# Patient Record
Sex: MALE | Race: WHITE | Employment: FULL TIME | URBAN - METROPOLITAN AREA
[De-identification: names, ages, dates, MRNs, and addresses within clinical notes are randomized per-mention and may not be internally consistent; named-entity substitution may affect disease eponyms.]

---

## 2017-01-01 ENCOUNTER — OFFICE VISIT (OUTPATIENT)
Dept: INTERNAL MEDICINE CLINIC | Age: 62
End: 2017-01-01

## 2017-01-01 ENCOUNTER — APPOINTMENT (OUTPATIENT)
Dept: MRI IMAGING | Age: 62
DRG: 023 | End: 2017-01-01
Attending: SPECIALIST
Payer: COMMERCIAL

## 2017-01-01 ENCOUNTER — APPOINTMENT (OUTPATIENT)
Dept: GENERAL RADIOLOGY | Age: 62
DRG: 023 | End: 2017-01-01
Attending: INTERNAL MEDICINE
Payer: COMMERCIAL

## 2017-01-01 ENCOUNTER — APPOINTMENT (OUTPATIENT)
Dept: GENERAL RADIOLOGY | Age: 62
DRG: 023 | End: 2017-01-01
Attending: EMERGENCY MEDICINE
Payer: COMMERCIAL

## 2017-01-01 ENCOUNTER — TELEPHONE (OUTPATIENT)
Dept: INTERNAL MEDICINE CLINIC | Age: 62
End: 2017-01-01

## 2017-01-01 ENCOUNTER — ANESTHESIA (OUTPATIENT)
Dept: SURGERY | Age: 62
DRG: 023 | End: 2017-01-01
Payer: COMMERCIAL

## 2017-01-01 ENCOUNTER — HOSPITAL ENCOUNTER (INPATIENT)
Age: 62
LOS: 2 days | DRG: 023 | End: 2017-05-02
Attending: EMERGENCY MEDICINE | Admitting: HOSPITALIST
Payer: COMMERCIAL

## 2017-01-01 ENCOUNTER — ANESTHESIA EVENT (OUTPATIENT)
Dept: SURGERY | Age: 62
DRG: 023 | End: 2017-01-01
Payer: COMMERCIAL

## 2017-01-01 ENCOUNTER — APPOINTMENT (OUTPATIENT)
Dept: ULTRASOUND IMAGING | Age: 62
DRG: 023 | End: 2017-01-01
Attending: INTERNAL MEDICINE
Payer: COMMERCIAL

## 2017-01-01 ENCOUNTER — APPOINTMENT (OUTPATIENT)
Dept: CT IMAGING | Age: 62
DRG: 023 | End: 2017-01-01
Attending: EMERGENCY MEDICINE
Payer: COMMERCIAL

## 2017-01-01 ENCOUNTER — APPOINTMENT (OUTPATIENT)
Dept: CT IMAGING | Age: 62
DRG: 023 | End: 2017-01-01
Attending: SPECIALIST
Payer: COMMERCIAL

## 2017-01-01 ENCOUNTER — APPOINTMENT (OUTPATIENT)
Dept: GENERAL RADIOLOGY | Age: 62
DRG: 023 | End: 2017-01-01
Attending: ANESTHESIOLOGY
Payer: COMMERCIAL

## 2017-01-01 VITALS
WEIGHT: 185 LBS | TEMPERATURE: 98.2 F | HEART RATE: 102 BPM | HEIGHT: 72 IN | RESPIRATION RATE: 16 BRPM | OXYGEN SATURATION: 98 % | SYSTOLIC BLOOD PRESSURE: 171 MMHG | BODY MASS INDEX: 25.06 KG/M2 | DIASTOLIC BLOOD PRESSURE: 104 MMHG

## 2017-01-01 VITALS
TEMPERATURE: 99.9 F | RESPIRATION RATE: 30 BRPM | HEART RATE: 143 BPM | BODY MASS INDEX: 23.75 KG/M2 | WEIGHT: 179.23 LBS | SYSTOLIC BLOOD PRESSURE: 133 MMHG | HEIGHT: 73 IN | OXYGEN SATURATION: 80 % | DIASTOLIC BLOOD PRESSURE: 52 MMHG

## 2017-01-01 VITALS
HEIGHT: 72 IN | BODY MASS INDEX: 24.92 KG/M2 | HEART RATE: 95 BPM | SYSTOLIC BLOOD PRESSURE: 151 MMHG | WEIGHT: 184 LBS | TEMPERATURE: 98.9 F | RESPIRATION RATE: 16 BRPM | DIASTOLIC BLOOD PRESSURE: 95 MMHG | OXYGEN SATURATION: 98 %

## 2017-01-01 DIAGNOSIS — I62.9 INTRACRANIAL HEMORRHAGE (HCC): ICD-10-CM

## 2017-01-01 DIAGNOSIS — A41.9 SEPSIS, DUE TO UNSPECIFIED ORGANISM: Primary | ICD-10-CM

## 2017-01-01 DIAGNOSIS — M62.838 MUSCLE SPASMS OF NECK: Primary | ICD-10-CM

## 2017-01-01 DIAGNOSIS — M62.838 CERVICAL PARASPINAL MUSCLE SPASM: ICD-10-CM

## 2017-01-01 DIAGNOSIS — M54.2 CERVICAL SPINE PAIN: Primary | ICD-10-CM

## 2017-01-01 DIAGNOSIS — R74.8 ELEVATED LIVER ENZYMES: ICD-10-CM

## 2017-01-01 LAB
ABO + RH BLD: NORMAL
ALBUMIN SERPL BCP-MCNC: 2.8 G/DL (ref 3.5–5)
ALBUMIN/GLOB SERPL: 0.6 {RATIO} (ref 1.1–2.2)
ALP SERPL-CCNC: 267 U/L (ref 45–117)
ALT SERPL-CCNC: 259 U/L (ref 12–78)
AMORPH CRY URNS QL MICRO: ABNORMAL
AMPHET UR QL SCN: NEGATIVE
ANION GAP BLD CALC-SCNC: 10 MMOL/L (ref 5–15)
ANION GAP BLD CALC-SCNC: 11 MMOL/L (ref 5–15)
APPEARANCE UR: ABNORMAL
APTT PPP: 32.6 SEC (ref 22.1–32.5)
APTT PPP: 33.6 SEC (ref 22.1–32.5)
ARTERIAL PATENCY WRIST A: ABNORMAL
ARTERIAL PATENCY WRIST A: YES
AST SERPL W P-5'-P-CCNC: 139 U/L (ref 15–37)
ATRIAL RATE: 100 BPM
BACTERIA URNS QL MICRO: ABNORMAL /HPF
BARBITURATES UR QL SCN: NEGATIVE
BASE DEFICIT BLDA-SCNC: 0.9 MMOL/L
BASE DEFICIT BLDA-SCNC: 1.7 MMOL/L
BASE DEFICIT BLDA-SCNC: 1.8 MMOL/L
BASE DEFICIT BLDA-SCNC: 2.3 MMOL/L
BASE EXCESS BLDA CALC-SCNC: 0 MMOL/L
BASOPHILS # BLD AUTO: 0 K/UL
BASOPHILS # BLD AUTO: 0 K/UL
BASOPHILS # BLD: 0 %
BASOPHILS # BLD: 0 %
BDY SITE: ABNORMAL
BENZODIAZ UR QL: POSITIVE
BILIRUB SERPL-MCNC: 1.1 MG/DL (ref 0.2–1)
BILIRUB UR QL CFM: NEGATIVE
BLD PROD TYP BPU: NORMAL
BLD PROD TYP BPU: NORMAL
BLOOD GROUP ANTIBODIES SERPL: NORMAL
BPU ID: NORMAL
BPU ID: NORMAL
BUN SERPL-MCNC: 26 MG/DL (ref 6–20)
BUN SERPL-MCNC: 27 MG/DL (ref 6–20)
BUN/CREAT SERPL: 25 (ref 12–20)
BUN/CREAT SERPL: 27 (ref 12–20)
CALCIUM SERPL-MCNC: 8.1 MG/DL (ref 8.5–10.1)
CALCIUM SERPL-MCNC: 9.3 MG/DL (ref 8.5–10.1)
CALCULATED P AXIS, ECG09: 56 DEGREES
CALCULATED R AXIS, ECG10: 25 DEGREES
CALCULATED T AXIS, ECG11: 63 DEGREES
CANNABINOIDS UR QL SCN: NEGATIVE
CHLORIDE SERPL-SCNC: 102 MMOL/L (ref 97–108)
CHLORIDE SERPL-SCNC: 110 MMOL/L (ref 97–108)
CO2 SERPL-SCNC: 23 MMOL/L (ref 21–32)
CO2 SERPL-SCNC: 23 MMOL/L (ref 21–32)
COCAINE UR QL SCN: NEGATIVE
COLOR UR: ABNORMAL
CREAT SERPL-MCNC: 0.95 MG/DL (ref 0.7–1.3)
CREAT SERPL-MCNC: 1.07 MG/DL (ref 0.7–1.3)
DIAGNOSIS, 93000: NORMAL
DIFFERENTIAL METHOD BLD: ABNORMAL
DIFFERENTIAL METHOD BLD: ABNORMAL
DRUG SCRN COMMENT,DRGCM: ABNORMAL
EOSINOPHIL # BLD: 0 K/UL
EOSINOPHIL # BLD: 0 K/UL
EOSINOPHIL NFR BLD: 0 %
EOSINOPHIL NFR BLD: 0 %
EPAP/CPAP/PEEP, PAPEEP: 0
EPAP/CPAP/PEEP, PAPEEP: 5
EPAP/CPAP/PEEP, PAPEEP: 5
EPITH CASTS URNS QL MICRO: ABNORMAL /LPF
ERYTHROCYTE [DISTWIDTH] IN BLOOD BY AUTOMATED COUNT: 13.2 % (ref 11.5–14.5)
ERYTHROCYTE [DISTWIDTH] IN BLOOD BY AUTOMATED COUNT: 13.8 % (ref 11.5–14.5)
FIO2 ON VENT: 100 %
FIO2 ON VENT: 100 %
FIO2 ON VENT: 50 %
GAS FLOW.O2 SETTING OXYMISER: 12 L/MIN
GAS FLOW.O2 SETTING OXYMISER: 16 L/MIN
GLOBULIN SER CALC-MCNC: 4.9 G/DL (ref 2–4)
GLUCOSE BLD STRIP.AUTO-MCNC: 131 MG/DL (ref 65–100)
GLUCOSE BLD STRIP.AUTO-MCNC: 157 MG/DL (ref 65–100)
GLUCOSE SERPL-MCNC: 124 MG/DL (ref 65–100)
GLUCOSE SERPL-MCNC: 151 MG/DL (ref 65–100)
GLUCOSE UR STRIP.AUTO-MCNC: NEGATIVE MG/DL
GRAN CASTS URNS QL MICRO: ABNORMAL /LPF
HCO3 BLDA-SCNC: 20 MMOL/L (ref 22–26)
HCO3 BLDA-SCNC: 21 MMOL/L (ref 22–26)
HCO3 BLDA-SCNC: 22 MMOL/L (ref 22–26)
HCO3 BLDA-SCNC: 22 MMOL/L (ref 22–26)
HCO3 BLDA-SCNC: 23 MMOL/L (ref 22–26)
HCT VFR BLD AUTO: 31.4 % (ref 36.6–50.3)
HCT VFR BLD AUTO: 40.9 % (ref 36.6–50.3)
HGB BLD-MCNC: 10.6 G/DL (ref 12.1–17)
HGB BLD-MCNC: 14.4 G/DL (ref 12.1–17)
HGB UR QL STRIP: ABNORMAL
INR PPP: 1.2 (ref 0.9–1.1)
INR PPP: 1.4 (ref 0.9–1.1)
KETONES UR QL STRIP.AUTO: NEGATIVE MG/DL
LACTATE SERPL-SCNC: 2 MMOL/L (ref 0.4–2)
LEUKOCYTE ESTERASE UR QL STRIP.AUTO: ABNORMAL
LYMPHOCYTES # BLD AUTO: 3 %
LYMPHOCYTES # BLD AUTO: 4 %
LYMPHOCYTES # BLD: 0.9 K/UL
LYMPHOCYTES # BLD: 0.9 K/UL
MCH RBC QN AUTO: 31.1 PG (ref 26–34)
MCH RBC QN AUTO: 32.2 PG (ref 26–34)
MCHC RBC AUTO-ENTMCNC: 33.8 G/DL (ref 30–36.5)
MCHC RBC AUTO-ENTMCNC: 35.2 G/DL (ref 30–36.5)
MCV RBC AUTO: 91.5 FL (ref 80–99)
MCV RBC AUTO: 92.1 FL (ref 80–99)
METHADONE UR QL: NEGATIVE
MONOCYTES # BLD: 0.7 K/UL
MONOCYTES # BLD: 2 K/UL
MONOCYTES NFR BLD AUTO: 3 %
MONOCYTES NFR BLD AUTO: 7 %
NEUTS BAND NFR BLD MANUAL: 2 %
NEUTS BAND NFR BLD MANUAL: 8 %
NEUTS SEG # BLD: 20.5 K/UL
NEUTS SEG # BLD: 26 K/UL
NEUTS SEG NFR BLD AUTO: 82 %
NEUTS SEG NFR BLD AUTO: 91 %
NITRITE UR QL STRIP.AUTO: NEGATIVE
OPIATES UR QL: NEGATIVE
P-R INTERVAL, ECG05: 116 MS
PATH REV BLD -IMP: NORMAL
PCO2 BLDA: 25 MMHG (ref 35–45)
PCO2 BLDA: 29 MMHG (ref 35–45)
PCO2 BLDA: 31 MMHG (ref 35–45)
PCO2 BLDA: 33 MMHG (ref 35–45)
PCO2 BLDA: 43 MMHG (ref 35–45)
PCP UR QL: NEGATIVE
PH BLDA: 7.35 [PH] (ref 7.35–7.45)
PH BLDA: 7.44 [PH] (ref 7.35–7.45)
PH BLDA: 7.45 [PH] (ref 7.35–7.45)
PH BLDA: 7.5 [PH] (ref 7.35–7.45)
PH BLDA: 7.52 [PH] (ref 7.35–7.45)
PH UR STRIP: 6 [PH] (ref 5–8)
PLATELET # BLD AUTO: 215 K/UL (ref 150–400)
PLATELET # BLD AUTO: 307 K/UL (ref 150–400)
PO2 BLDA: 159 MMHG (ref 80–100)
PO2 BLDA: 178 MMHG (ref 80–100)
PO2 BLDA: 183 MMHG (ref 80–100)
PO2 BLDA: 272 MMHG (ref 80–100)
PO2 BLDA: 342 MMHG (ref 80–100)
POTASSIUM SERPL-SCNC: 3.6 MMOL/L (ref 3.5–5.1)
POTASSIUM SERPL-SCNC: 3.9 MMOL/L (ref 3.5–5.1)
PROT SERPL-MCNC: 7.7 G/DL (ref 6.4–8.2)
PROT UR STRIP-MCNC: 100 MG/DL
PROTHROMBIN TIME: 12.5 SEC (ref 9–11.1)
PROTHROMBIN TIME: 14.1 SEC (ref 9–11.1)
Q-T INTERVAL, ECG07: 320 MS
QRS DURATION, ECG06: 90 MS
QTC CALCULATION (BEZET), ECG08: 412 MS
RBC # BLD AUTO: 3.41 M/UL (ref 4.1–5.7)
RBC # BLD AUTO: 4.47 M/UL (ref 4.1–5.7)
RBC #/AREA URNS HPF: ABNORMAL /HPF (ref 0–5)
RBC MORPH BLD: ABNORMAL
RBC MORPH BLD: ABNORMAL
SAO2 % BLD: 100 % (ref 92–97)
SAO2 % BLD: 100 % (ref 92–97)
SAO2 % BLD: 99 % (ref 92–97)
SAO2% DEVICE SAO2% SENSOR NAME: ABNORMAL
SERVICE CMNT-IMP: ABNORMAL
SERVICE CMNT-IMP: ABNORMAL
SODIUM SERPL-SCNC: 136 MMOL/L (ref 136–145)
SODIUM SERPL-SCNC: 143 MMOL/L (ref 136–145)
SP GR UR REFRACTOMETRY: 1.02 (ref 1–1.03)
SPECIMEN EXP DATE BLD: NORMAL
SPECIMEN SITE: ABNORMAL
STATUS OF UNIT,%ST: NORMAL
STATUS OF UNIT,%ST: NORMAL
THERAPEUTIC RANGE,PTTT: ABNORMAL SECS (ref 58–77)
THERAPEUTIC RANGE,PTTT: ABNORMAL SECS (ref 58–77)
UA: UC IF INDICATED,UAUC: ABNORMAL
UNIT DIVISION, %UDIV: 0
UNIT DIVISION, %UDIV: 0
UROBILINOGEN UR QL STRIP.AUTO: 1 EU/DL (ref 0.2–1)
VENTILATION MODE VENT: ABNORMAL
VENTRICULAR RATE, ECG03: 100 BPM
VT SETTING VENT: 470 ML
VT SETTING VENT: 550 ML
VT SETTING VENT: 550 ML
VT SETTING VENT: 750 ML
VT SETTING VENT: 750 ML
WBC # BLD AUTO: 22.1 K/UL (ref 4.1–11.1)
WBC # BLD AUTO: 28.9 K/UL (ref 4.1–11.1)
WBC MORPH BLD: ABNORMAL
WBC URNS QL MICRO: ABNORMAL /HPF (ref 0–4)

## 2017-01-01 PROCEDURE — 76700 US EXAM ABDOM COMPLETE: CPT

## 2017-01-01 PROCEDURE — 82803 BLOOD GASES ANY COMBINATION: CPT | Performed by: EMERGENCY MEDICINE

## 2017-01-01 PROCEDURE — 31500 INSERT EMERGENCY AIRWAY: CPT

## 2017-01-01 PROCEDURE — 74011250636 HC RX REV CODE- 250/636

## 2017-01-01 PROCEDURE — 77030008768 HC TU NG VYGC -A

## 2017-01-01 PROCEDURE — 85025 COMPLETE CBC W/AUTO DIFF WBC: CPT | Performed by: EMERGENCY MEDICINE

## 2017-01-01 PROCEDURE — 36600 WITHDRAWAL OF ARTERIAL BLOOD: CPT | Performed by: EMERGENCY MEDICINE

## 2017-01-01 PROCEDURE — 74011000258 HC RX REV CODE- 258: Performed by: EMERGENCY MEDICINE

## 2017-01-01 PROCEDURE — 94002 VENT MGMT INPAT INIT DAY: CPT

## 2017-01-01 PROCEDURE — 74011250637 HC RX REV CODE- 250/637: Performed by: HOSPITALIST

## 2017-01-01 PROCEDURE — C9113 INJ PANTOPRAZOLE SODIUM, VIA: HCPCS | Performed by: INTERNAL MEDICINE

## 2017-01-01 PROCEDURE — 77030005518 HC CATH URETH FOL 2W BARD -B

## 2017-01-01 PROCEDURE — 74011000250 HC RX REV CODE- 250: Performed by: EMERGENCY MEDICINE

## 2017-01-01 PROCEDURE — 77030018836 HC SOL IRR NACL ICUM -A: Performed by: SPECIALIST

## 2017-01-01 PROCEDURE — P9045 ALBUMIN (HUMAN), 5%, 250 ML: HCPCS

## 2017-01-01 PROCEDURE — 71010 XR CHEST PORT: CPT

## 2017-01-01 PROCEDURE — C1713 ANCHOR/SCREW BN/BN,TIS/BN: HCPCS | Performed by: SPECIALIST

## 2017-01-01 PROCEDURE — 80048 BASIC METABOLIC PNL TOTAL CA: CPT | Performed by: SPECIALIST

## 2017-01-01 PROCEDURE — 77030002946 HC SUT NRLN J&J -B: Performed by: SPECIALIST

## 2017-01-01 PROCEDURE — P9059 PLASMA, FRZ BETWEEN 8-24HOUR: HCPCS | Performed by: SPECIALIST

## 2017-01-01 PROCEDURE — 36430 TRANSFUSION BLD/BLD COMPNT: CPT

## 2017-01-01 PROCEDURE — 80307 DRUG TEST PRSMV CHEM ANLYZR: CPT | Performed by: EMERGENCY MEDICINE

## 2017-01-01 PROCEDURE — 77030014007 HC SPNG HEMSTAT J&J -B: Performed by: SPECIALIST

## 2017-01-01 PROCEDURE — 77030012602 HC SPNG PTTY NEUR J&J -B: Performed by: SPECIALIST

## 2017-01-01 PROCEDURE — 77030013797 HC KT TRNSDUC PRSSR EDWD -A

## 2017-01-01 PROCEDURE — 77030003892 HC BIT DRL TWST MEDT -B: Performed by: SPECIALIST

## 2017-01-01 PROCEDURE — 77030032490 HC SLV COMPR SCD KNE COVD -B: Performed by: SPECIALIST

## 2017-01-01 PROCEDURE — 85610 PROTHROMBIN TIME: CPT | Performed by: SPECIALIST

## 2017-01-01 PROCEDURE — 00900ZZ DRAINAGE OF BRAIN, OPEN APPROACH: ICD-10-PCS | Performed by: SPECIALIST

## 2017-01-01 PROCEDURE — 96365 THER/PROPH/DIAG IV INF INIT: CPT

## 2017-01-01 PROCEDURE — 87147 CULTURE TYPE IMMUNOLOGIC: CPT | Performed by: EMERGENCY MEDICINE

## 2017-01-01 PROCEDURE — 85025 COMPLETE CBC W/AUTO DIFF WBC: CPT | Performed by: SPECIALIST

## 2017-01-01 PROCEDURE — 74011250636 HC RX REV CODE- 250/636: Performed by: ANESTHESIOLOGY

## 2017-01-01 PROCEDURE — 70553 MRI BRAIN STEM W/O & W/DYE: CPT

## 2017-01-01 PROCEDURE — 36415 COLL VENOUS BLD VENIPUNCTURE: CPT | Performed by: EMERGENCY MEDICINE

## 2017-01-01 PROCEDURE — 74011250636 HC RX REV CODE- 250/636: Performed by: SPECIALIST

## 2017-01-01 PROCEDURE — 94003 VENT MGMT INPAT SUBQ DAY: CPT

## 2017-01-01 PROCEDURE — 74011250636 HC RX REV CODE- 250/636: Performed by: EMERGENCY MEDICINE

## 2017-01-01 PROCEDURE — 99285 EMERGENCY DEPT VISIT HI MDM: CPT

## 2017-01-01 PROCEDURE — 86900 BLOOD TYPING SEROLOGIC ABO: CPT | Performed by: SPECIALIST

## 2017-01-01 PROCEDURE — 77010033678 HC OXYGEN DAILY

## 2017-01-01 PROCEDURE — 77030004472 HC BUR TAPR MEDT -B: Performed by: SPECIALIST

## 2017-01-01 PROCEDURE — C1751 CATH, INF, PER/CENT/MIDLINE: HCPCS

## 2017-01-01 PROCEDURE — 96375 TX/PRO/DX INJ NEW DRUG ADDON: CPT

## 2017-01-01 PROCEDURE — 5A1945Z RESPIRATORY VENTILATION, 24-96 CONSECUTIVE HOURS: ICD-10-PCS | Performed by: EMERGENCY MEDICINE

## 2017-01-01 PROCEDURE — 03HB33Z INSERTION OF INFUSION DEVICE INTO RIGHT RADIAL ARTERY, PERCUTANEOUS APPROACH: ICD-10-PCS | Performed by: ANESTHESIOLOGY

## 2017-01-01 PROCEDURE — A6209 FOAM DRSG <=16 SQ IN W/O BDR: HCPCS

## 2017-01-01 PROCEDURE — 77030004416 HC BUR PERF J&J -C: Performed by: SPECIALIST

## 2017-01-01 PROCEDURE — 88304 TISSUE EXAM BY PATHOLOGIST: CPT | Performed by: SPECIALIST

## 2017-01-01 PROCEDURE — 74011000250 HC RX REV CODE- 250: Performed by: SPECIALIST

## 2017-01-01 PROCEDURE — 70450 CT HEAD/BRAIN W/O DYE: CPT

## 2017-01-01 PROCEDURE — 74011000258 HC RX REV CODE- 258: Performed by: SPECIALIST

## 2017-01-01 PROCEDURE — 36415 COLL VENOUS BLD VENIPUNCTURE: CPT | Performed by: SPECIALIST

## 2017-01-01 PROCEDURE — 77030014355 HC CVR BUR H TI BIOM -C: Performed by: SPECIALIST

## 2017-01-01 PROCEDURE — 77030008683 HC TU ET CUF COVD -A

## 2017-01-01 PROCEDURE — 77030003029 HC SUT VCRL J&J -B: Performed by: SPECIALIST

## 2017-01-01 PROCEDURE — 80053 COMPREHEN METABOLIC PANEL: CPT | Performed by: EMERGENCY MEDICINE

## 2017-01-01 PROCEDURE — 74011250637 HC RX REV CODE- 250/637: Performed by: NEUROLOGICAL SURGERY

## 2017-01-01 PROCEDURE — 82803 BLOOD GASES ANY COMBINATION: CPT | Performed by: INTERNAL MEDICINE

## 2017-01-01 PROCEDURE — 87040 BLOOD CULTURE FOR BACTERIA: CPT | Performed by: EMERGENCY MEDICINE

## 2017-01-01 PROCEDURE — 77030005537 HC CATH URETH BARD -A: Performed by: SPECIALIST

## 2017-01-01 PROCEDURE — 87086 URINE CULTURE/COLONY COUNT: CPT | Performed by: EMERGENCY MEDICINE

## 2017-01-01 PROCEDURE — 74011250636 HC RX REV CODE- 250/636: Performed by: NEUROLOGICAL SURGERY

## 2017-01-01 PROCEDURE — 82962 GLUCOSE BLOOD TEST: CPT

## 2017-01-01 PROCEDURE — 77030030722 HC PIN SKULL MAYFLD INLC -B: Performed by: SPECIALIST

## 2017-01-01 PROCEDURE — 74011000250 HC RX REV CODE- 250

## 2017-01-01 PROCEDURE — 65610000006 HC RM INTENSIVE CARE

## 2017-01-01 PROCEDURE — 74011250636 HC RX REV CODE- 250/636: Performed by: HOSPITALIST

## 2017-01-01 PROCEDURE — 75810000137 HC PLCMT CENT VENOUS CATH

## 2017-01-01 PROCEDURE — 77030014647 HC SEAL FBRN TISSL BAXT -D: Performed by: SPECIALIST

## 2017-01-01 PROCEDURE — 94762 N-INVAS EAR/PLS OXIMTRY CONT: CPT

## 2017-01-01 PROCEDURE — 0BH17EZ INSERTION OF ENDOTRACHEAL AIRWAY INTO TRACHEA, VIA NATURAL OR ARTIFICIAL OPENING: ICD-10-PCS | Performed by: EMERGENCY MEDICINE

## 2017-01-01 PROCEDURE — 85730 THROMBOPLASTIN TIME PARTIAL: CPT | Performed by: SPECIALIST

## 2017-01-01 PROCEDURE — 81001 URINALYSIS AUTO W/SCOPE: CPT | Performed by: EMERGENCY MEDICINE

## 2017-01-01 PROCEDURE — 77030002996 HC SUT SLK J&J -A: Performed by: SPECIALIST

## 2017-01-01 PROCEDURE — 77030018390 HC SPNG HEMSTAT2 J&J -B: Performed by: SPECIALIST

## 2017-01-01 PROCEDURE — 96367 TX/PROPH/DG ADDL SEQ IV INF: CPT

## 2017-01-01 PROCEDURE — 74011000258 HC RX REV CODE- 258: Performed by: HOSPITALIST

## 2017-01-01 PROCEDURE — 74000 XR ABD PORT  1 V: CPT

## 2017-01-01 PROCEDURE — 77030009081 HC CLP NEUR GUN SET MEDT -B: Performed by: SPECIALIST

## 2017-01-01 PROCEDURE — 76060000037 HC ANESTHESIA 3 TO 3.5 HR: Performed by: SPECIALIST

## 2017-01-01 PROCEDURE — 83605 ASSAY OF LACTIC ACID: CPT | Performed by: EMERGENCY MEDICINE

## 2017-01-01 PROCEDURE — 74011250637 HC RX REV CODE- 250/637: Performed by: EMERGENCY MEDICINE

## 2017-01-01 PROCEDURE — 02HV33Z INSERTION OF INFUSION DEVICE INTO SUPERIOR VENA CAVA, PERCUTANEOUS APPROACH: ICD-10-PCS | Performed by: ANESTHESIOLOGY

## 2017-01-01 PROCEDURE — 74011250636 HC RX REV CODE- 250/636: Performed by: INTERNAL MEDICINE

## 2017-01-01 PROCEDURE — 77030018673: Performed by: SPECIALIST

## 2017-01-01 PROCEDURE — 77030013797 HC KT TRNSDUC PRSSR EDWD -A: Performed by: ANESTHESIOLOGY

## 2017-01-01 PROCEDURE — 87077 CULTURE AEROBIC IDENTIFY: CPT | Performed by: EMERGENCY MEDICINE

## 2017-01-01 PROCEDURE — 74011000258 HC RX REV CODE- 258: Performed by: NEUROLOGICAL SURGERY

## 2017-01-01 PROCEDURE — 93005 ELECTROCARDIOGRAM TRACING: CPT

## 2017-01-01 PROCEDURE — 77030010940 HC CLP SCALP RELD MEDT -B: Performed by: SPECIALIST

## 2017-01-01 PROCEDURE — 85730 THROMBOPLASTIN TIME PARTIAL: CPT | Performed by: EMERGENCY MEDICINE

## 2017-01-01 PROCEDURE — A9576 INJ PROHANCE MULTIPACK: HCPCS | Performed by: EMERGENCY MEDICINE

## 2017-01-01 PROCEDURE — 74011000272 HC RX REV CODE- 272: Performed by: SPECIALIST

## 2017-01-01 PROCEDURE — 87186 SC STD MICRODIL/AGAR DIL: CPT | Performed by: EMERGENCY MEDICINE

## 2017-01-01 PROCEDURE — 74011000250 HC RX REV CODE- 250: Performed by: INTERNAL MEDICINE

## 2017-01-01 PROCEDURE — 85610 PROTHROMBIN TIME: CPT | Performed by: EMERGENCY MEDICINE

## 2017-01-01 PROCEDURE — 77030029131 HC ADMN ST IV BLD N DEHP ICUM -B

## 2017-01-01 PROCEDURE — 76010000173 HC OR TIME 3 TO 3.5 HR INTENSV-TIER 1: Performed by: SPECIALIST

## 2017-01-01 PROCEDURE — 77030004391 HC BUR FLUT MEDT -C: Performed by: SPECIALIST

## 2017-01-01 PROCEDURE — 74011250637 HC RX REV CODE- 250/637: Performed by: SPECIALIST

## 2017-01-01 DEVICE — SCREW BONE L4MM DIA1.5MM CRANIOMAXILLOFACIAL GLD TI ST: Type: IMPLANTABLE DEVICE | Site: SKULL | Status: FUNCTIONAL

## 2017-01-01 DEVICE — COVER BUR H L DIA18.5MM THK0.5MM 5 H NEURO TI W/O TAB: Type: IMPLANTABLE DEVICE | Site: SKULL | Status: FUNCTIONAL

## 2017-01-01 RX ORDER — SODIUM CHLORIDE, SODIUM LACTATE, POTASSIUM CHLORIDE, CALCIUM CHLORIDE 600; 310; 30; 20 MG/100ML; MG/100ML; MG/100ML; MG/100ML
25 INJECTION, SOLUTION INTRAVENOUS CONTINUOUS
Status: DISCONTINUED | OUTPATIENT
Start: 2017-01-01 | End: 2017-01-01

## 2017-01-01 RX ORDER — ALBUMIN HUMAN 50 G/1000ML
SOLUTION INTRAVENOUS AS NEEDED
Status: DISCONTINUED | OUTPATIENT
Start: 2017-01-01 | End: 2017-01-01 | Stop reason: HOSPADM

## 2017-01-01 RX ORDER — SODIUM CHLORIDE 0.9 % (FLUSH) 0.9 %
5-10 SYRINGE (ML) INJECTION AS NEEDED
Status: DISCONTINUED | OUTPATIENT
Start: 2017-01-01 | End: 2017-01-01 | Stop reason: HOSPADM

## 2017-01-01 RX ORDER — DEXAMETHASONE SODIUM PHOSPHATE 4 MG/ML
INJECTION, SOLUTION INTRA-ARTICULAR; INTRALESIONAL; INTRAMUSCULAR; INTRAVENOUS; SOFT TISSUE AS NEEDED
Status: DISCONTINUED | OUTPATIENT
Start: 2017-01-01 | End: 2017-01-01 | Stop reason: HOSPADM

## 2017-01-01 RX ORDER — LEVOFLOXACIN 5 MG/ML
750 INJECTION, SOLUTION INTRAVENOUS EVERY 24 HOURS
Status: DISCONTINUED | OUTPATIENT
Start: 2017-01-01 | End: 2017-01-01 | Stop reason: HOSPADM

## 2017-01-01 RX ORDER — ROCURONIUM BROMIDE 10 MG/ML
INJECTION, SOLUTION INTRAVENOUS
Status: COMPLETED | OUTPATIENT
Start: 2017-01-01 | End: 2017-01-01

## 2017-01-01 RX ORDER — SODIUM CHLORIDE 0.9 % (FLUSH) 0.9 %
5-10 SYRINGE (ML) INJECTION EVERY 8 HOURS
Status: DISCONTINUED | OUTPATIENT
Start: 2017-01-01 | End: 2017-01-01 | Stop reason: HOSPADM

## 2017-01-01 RX ORDER — ONDANSETRON 2 MG/ML
4 INJECTION INTRAMUSCULAR; INTRAVENOUS
Status: COMPLETED | OUTPATIENT
Start: 2017-01-01 | End: 2017-01-01

## 2017-01-01 RX ORDER — LORAZEPAM 2 MG/ML
2 INJECTION INTRAMUSCULAR
Status: DISCONTINUED | OUTPATIENT
Start: 2017-01-01 | End: 2017-01-01 | Stop reason: HOSPADM

## 2017-01-01 RX ORDER — MUPIROCIN 20 MG/G
OINTMENT TOPICAL 2 TIMES DAILY
Status: DISCONTINUED | OUTPATIENT
Start: 2017-01-01 | End: 2017-01-01 | Stop reason: HOSPADM

## 2017-01-01 RX ORDER — SODIUM CHLORIDE 9 MG/ML
25 INJECTION, SOLUTION INTRAVENOUS CONTINUOUS
Status: DISCONTINUED | OUTPATIENT
Start: 2017-01-01 | End: 2017-01-01 | Stop reason: HOSPADM

## 2017-01-01 RX ORDER — ACETAMINOPHEN 650 MG/1
650 SUPPOSITORY RECTAL
Status: DISCONTINUED | OUTPATIENT
Start: 2017-01-01 | End: 2017-01-01 | Stop reason: HOSPADM

## 2017-01-01 RX ORDER — SODIUM CHLORIDE 9 MG/ML
INJECTION, SOLUTION INTRAVENOUS
Status: DISCONTINUED | OUTPATIENT
Start: 2017-01-01 | End: 2017-01-01 | Stop reason: HOSPADM

## 2017-01-01 RX ORDER — SODIUM CHLORIDE, SODIUM LACTATE, POTASSIUM CHLORIDE, CALCIUM CHLORIDE 600; 310; 30; 20 MG/100ML; MG/100ML; MG/100ML; MG/100ML
25 INJECTION, SOLUTION INTRAVENOUS CONTINUOUS
Status: DISPENSED | OUTPATIENT
Start: 2017-01-01 | End: 2017-01-01

## 2017-01-01 RX ORDER — LIDOCAINE HYDROCHLORIDE 10 MG/ML
0.1 INJECTION, SOLUTION EPIDURAL; INFILTRATION; INTRACAUDAL; PERINEURAL AS NEEDED
Status: DISCONTINUED | OUTPATIENT
Start: 2017-01-01 | End: 2017-01-01 | Stop reason: HOSPADM

## 2017-01-01 RX ORDER — MORPHINE SULFATE 4 MG/ML
2-8 INJECTION, SOLUTION INTRAMUSCULAR; INTRAVENOUS AS NEEDED
Status: DISCONTINUED | OUTPATIENT
Start: 2017-01-01 | End: 2017-01-01 | Stop reason: HOSPADM

## 2017-01-01 RX ORDER — ACETAMINOPHEN 325 MG/1
650 TABLET ORAL ONCE
Status: DISCONTINUED | OUTPATIENT
Start: 2017-01-01 | End: 2017-01-01

## 2017-01-01 RX ORDER — ACETAMINOPHEN 325 MG/1
650 TABLET ORAL
Status: DISCONTINUED | OUTPATIENT
Start: 2017-01-01 | End: 2017-01-01 | Stop reason: HOSPADM

## 2017-01-01 RX ORDER — CEFAZOLIN SODIUM IN 0.9 % NACL 2 G/100 ML
PLASTIC BAG, INJECTION (ML) INTRAVENOUS AS NEEDED
Status: DISCONTINUED | OUTPATIENT
Start: 2017-01-01 | End: 2017-01-01 | Stop reason: HOSPADM

## 2017-01-01 RX ORDER — NICARDIPINE HYDROCHLORIDE 0.2 MG/ML
5-15 INJECTION INTRAVENOUS
Status: DISCONTINUED | OUTPATIENT
Start: 2017-01-01 | End: 2017-01-01

## 2017-01-01 RX ORDER — PREDNISONE 20 MG/1
TABLET ORAL
Qty: 18 TAB | Refills: 0 | Status: SHIPPED | OUTPATIENT
Start: 2017-01-01 | End: 2017-01-01 | Stop reason: SDUPTHER

## 2017-01-01 RX ORDER — ONDANSETRON 2 MG/ML
INJECTION INTRAMUSCULAR; INTRAVENOUS AS NEEDED
Status: DISCONTINUED | OUTPATIENT
Start: 2017-01-01 | End: 2017-01-01 | Stop reason: HOSPADM

## 2017-01-01 RX ORDER — DIAZEPAM 2 MG/1
2-4 TABLET ORAL
Qty: 20 TAB | Refills: 0 | Status: SHIPPED | OUTPATIENT
Start: 2017-01-01

## 2017-01-01 RX ORDER — VANCOMYCIN/0.9 % SOD CHLORIDE 1.5G/250ML
1500 PLASTIC BAG, INJECTION (ML) INTRAVENOUS EVERY 12 HOURS
Status: DISCONTINUED | OUTPATIENT
Start: 2017-01-01 | End: 2017-01-01 | Stop reason: HOSPADM

## 2017-01-01 RX ORDER — HYDROMORPHONE HYDROCHLORIDE 1 MG/ML
0.2 INJECTION, SOLUTION INTRAMUSCULAR; INTRAVENOUS; SUBCUTANEOUS
Status: DISCONTINUED | OUTPATIENT
Start: 2017-01-01 | End: 2017-01-01 | Stop reason: HOSPADM

## 2017-01-01 RX ORDER — SODIUM CHLORIDE 0.9 % (FLUSH) 0.9 %
10 SYRINGE (ML) INJECTION
Status: COMPLETED | OUTPATIENT
Start: 2017-01-01 | End: 2017-01-01

## 2017-01-01 RX ORDER — DIAZEPAM 5 MG/1
5 TABLET ORAL
Qty: 20 TAB | Refills: 0 | Status: SHIPPED | OUTPATIENT
Start: 2017-01-01

## 2017-01-01 RX ORDER — PROPOFOL 10 MG/ML
5-50 VIAL (ML) INTRAVENOUS
Status: DISCONTINUED | OUTPATIENT
Start: 2017-01-01 | End: 2017-01-01 | Stop reason: HOSPADM

## 2017-01-01 RX ORDER — LIDOCAINE HYDROCHLORIDE AND EPINEPHRINE 10; 10 MG/ML; UG/ML
INJECTION, SOLUTION INFILTRATION; PERINEURAL AS NEEDED
Status: DISCONTINUED | OUTPATIENT
Start: 2017-01-01 | End: 2017-01-01 | Stop reason: HOSPADM

## 2017-01-01 RX ORDER — MORPHINE SULFATE 2 MG/ML
2 INJECTION, SOLUTION INTRAMUSCULAR; INTRAVENOUS
Status: DISCONTINUED | OUTPATIENT
Start: 2017-01-01 | End: 2017-01-01 | Stop reason: HOSPADM

## 2017-01-01 RX ORDER — FENTANYL CITRATE 50 UG/ML
25 INJECTION, SOLUTION INTRAMUSCULAR; INTRAVENOUS
Status: DISCONTINUED | OUTPATIENT
Start: 2017-01-01 | End: 2017-01-01 | Stop reason: HOSPADM

## 2017-01-01 RX ORDER — MANNITOL 20 G/100ML
50 INJECTION, SOLUTION INTRAVENOUS
Status: COMPLETED | OUTPATIENT
Start: 2017-01-01 | End: 2017-01-01

## 2017-01-01 RX ORDER — FENTANYL CITRATE 50 UG/ML
INJECTION, SOLUTION INTRAMUSCULAR; INTRAVENOUS AS NEEDED
Status: DISCONTINUED | OUTPATIENT
Start: 2017-01-01 | End: 2017-01-01 | Stop reason: HOSPADM

## 2017-01-01 RX ORDER — ONDANSETRON 2 MG/ML
4 INJECTION INTRAMUSCULAR; INTRAVENOUS
Status: DISCONTINUED | OUTPATIENT
Start: 2017-01-01 | End: 2017-01-01 | Stop reason: HOSPADM

## 2017-01-01 RX ORDER — PROPOFOL 10 MG/ML
5 VIAL (ML) INTRAVENOUS
Status: DISCONTINUED | OUTPATIENT
Start: 2017-01-01 | End: 2017-01-01

## 2017-01-01 RX ORDER — LORAZEPAM 2 MG/ML
INJECTION INTRAMUSCULAR
Status: COMPLETED
Start: 2017-01-01 | End: 2017-01-01

## 2017-01-01 RX ORDER — ONDANSETRON 2 MG/ML
INJECTION INTRAMUSCULAR; INTRAVENOUS
Status: COMPLETED
Start: 2017-01-01 | End: 2017-01-01

## 2017-01-01 RX ORDER — PROPOFOL 10 MG/ML
5-50 VIAL (ML) INTRAVENOUS
Status: DISCONTINUED | OUTPATIENT
Start: 2017-01-01 | End: 2017-01-01

## 2017-01-01 RX ORDER — ROCURONIUM BROMIDE 10 MG/ML
INJECTION, SOLUTION INTRAVENOUS AS NEEDED
Status: DISCONTINUED | OUTPATIENT
Start: 2017-01-01 | End: 2017-01-01 | Stop reason: HOSPADM

## 2017-01-01 RX ORDER — DIPHENHYDRAMINE HYDROCHLORIDE 50 MG/ML
12.5 INJECTION, SOLUTION INTRAMUSCULAR; INTRAVENOUS AS NEEDED
Status: ACTIVE | OUTPATIENT
Start: 2017-01-01 | End: 2017-01-01

## 2017-01-01 RX ORDER — PHENYLEPHRINE HCL IN 0.9% NACL 0.4MG/10ML
SYRINGE (ML) INTRAVENOUS AS NEEDED
Status: DISCONTINUED | OUTPATIENT
Start: 2017-01-01 | End: 2017-01-01 | Stop reason: HOSPADM

## 2017-01-01 RX ORDER — SODIUM CHLORIDE AND POTASSIUM CHLORIDE .9; .15 G/100ML; G/100ML
SOLUTION INTRAVENOUS CONTINUOUS
Status: DISCONTINUED | OUTPATIENT
Start: 2017-01-01 | End: 2017-01-01 | Stop reason: HOSPADM

## 2017-01-01 RX ORDER — CHLORHEXIDINE GLUCONATE 1.2 MG/ML
15 RINSE ORAL 2 TIMES DAILY
Status: DISCONTINUED | OUTPATIENT
Start: 2017-01-01 | End: 2017-01-01 | Stop reason: HOSPADM

## 2017-01-01 RX ORDER — SODIUM CHLORIDE 9 MG/ML
250 INJECTION, SOLUTION INTRAVENOUS AS NEEDED
Status: DISCONTINUED | OUTPATIENT
Start: 2017-01-01 | End: 2017-01-01 | Stop reason: HOSPADM

## 2017-01-01 RX ORDER — ETOMIDATE 2 MG/ML
INJECTION INTRAVENOUS
Status: COMPLETED | OUTPATIENT
Start: 2017-01-01 | End: 2017-01-01

## 2017-01-01 RX ORDER — PREDNISONE 20 MG/1
TABLET ORAL
Qty: 18 TAB | Refills: 0 | Status: SHIPPED | OUTPATIENT
Start: 2017-01-01

## 2017-01-01 RX ORDER — VANCOMYCIN 2 GRAM/500 ML IN 0.9 % SODIUM CHLORIDE INTRAVENOUS
2000 ONCE
Status: COMPLETED | OUTPATIENT
Start: 2017-01-01 | End: 2017-01-01

## 2017-01-01 RX ADMIN — SODIUM CHLORIDE 25 ML/HR: 900 INJECTION, SOLUTION INTRAVENOUS at 20:15

## 2017-01-01 RX ADMIN — Medication 10 ML: at 05:23

## 2017-01-01 RX ADMIN — Medication 10 ML: at 14:16

## 2017-01-01 RX ADMIN — CEFEPIME HYDROCHLORIDE 2 G: 2 INJECTION, POWDER, FOR SOLUTION INTRAVENOUS at 00:10

## 2017-01-01 RX ADMIN — LEVETIRACETAM 500 MG: 100 INJECTION, SOLUTION, CONCENTRATE INTRAVENOUS at 21:00

## 2017-01-01 RX ADMIN — PROPOFOL 50 MCG/KG/MIN: 10 INJECTION, EMULSION INTRAVENOUS at 23:19

## 2017-01-01 RX ADMIN — LORAZEPAM 2 MG: 2 INJECTION INTRAMUSCULAR; INTRAVENOUS at 22:26

## 2017-01-01 RX ADMIN — PHENYLEPHRINE HYDROCHLORIDE 120 MCG/MIN: 10 INJECTION INTRAVENOUS at 20:18

## 2017-01-01 RX ADMIN — Medication 60 MCG: at 18:19

## 2017-01-01 RX ADMIN — ONDANSETRON 4 MG: 2 INJECTION INTRAMUSCULAR; INTRAVENOUS at 14:15

## 2017-01-01 RX ADMIN — MORPHINE SULFATE 8 MG: 4 INJECTION, SOLUTION INTRAMUSCULAR; INTRAVENOUS at 00:41

## 2017-01-01 RX ADMIN — CHLORHEXIDINE GLUCONATE 15 ML: 1.2 RINSE ORAL at 08:29

## 2017-01-01 RX ADMIN — ACETAMINOPHEN 650 MG: 650 SUPPOSITORY RECTAL at 00:03

## 2017-01-01 RX ADMIN — Medication 10 ML: at 22:27

## 2017-01-01 RX ADMIN — ROCURONIUM BROMIDE 100 MG: 10 INJECTION INTRAVENOUS at 14:48

## 2017-01-01 RX ADMIN — VANCOMYCIN HYDROCHLORIDE 2000 MG: 10 INJECTION, POWDER, LYOPHILIZED, FOR SOLUTION INTRAVENOUS at 11:08

## 2017-01-01 RX ADMIN — SODIUM CHLORIDE AND POTASSIUM CHLORIDE: 9; 1.49 INJECTION, SOLUTION INTRAVENOUS at 23:23

## 2017-01-01 RX ADMIN — PROPOFOL 8 MCG/KG/MIN: 10 INJECTION, EMULSION INTRAVENOUS at 15:20

## 2017-01-01 RX ADMIN — MORPHINE SULFATE 4 MG: 4 INJECTION, SOLUTION INTRAMUSCULAR; INTRAVENOUS at 21:07

## 2017-01-01 RX ADMIN — MORPHINE SULFATE 4 MG: 4 INJECTION, SOLUTION INTRAMUSCULAR; INTRAVENOUS at 21:53

## 2017-01-01 RX ADMIN — CHLORHEXIDINE GLUCONATE 15 ML: 1.2 RINSE ORAL at 22:24

## 2017-01-01 RX ADMIN — SODIUM CHLORIDE 500 ML: 900 INJECTION, SOLUTION INTRAVENOUS at 13:53

## 2017-01-01 RX ADMIN — ONDANSETRON HYDROCHLORIDE 4 MG: 2 INJECTION, SOLUTION INTRAMUSCULAR; INTRAVENOUS at 14:15

## 2017-01-01 RX ADMIN — MUPIROCIN: 20 OINTMENT TOPICAL at 22:26

## 2017-01-01 RX ADMIN — HYDROMORPHONE HYDROCHLORIDE 0.2 MG: 1 INJECTION, SOLUTION INTRAMUSCULAR; INTRAVENOUS; SUBCUTANEOUS at 23:38

## 2017-01-01 RX ADMIN — LEVOFLOXACIN 750 MG: 5 INJECTION, SOLUTION INTRAVENOUS at 08:44

## 2017-01-01 RX ADMIN — LEVETIRACETAM 500 MG: 100 INJECTION, SOLUTION, CONCENTRATE INTRAVENOUS at 22:49

## 2017-01-01 RX ADMIN — ROCURONIUM BROMIDE 50 MG: 10 INJECTION, SOLUTION INTRAVENOUS at 17:04

## 2017-01-01 RX ADMIN — MORPHINE SULFATE 2 MG: 4 INJECTION, SOLUTION INTRAMUSCULAR; INTRAVENOUS at 20:24

## 2017-01-01 RX ADMIN — ONDANSETRON 4 MG: 2 INJECTION INTRAMUSCULAR; INTRAVENOUS at 19:18

## 2017-01-01 RX ADMIN — Medication 2 MG: at 18:38

## 2017-01-01 RX ADMIN — FENTANYL CITRATE 100 MCG: 50 INJECTION, SOLUTION INTRAMUSCULAR; INTRAVENOUS at 16:58

## 2017-01-01 RX ADMIN — LEVETIRACETAM 500 MG: 100 INJECTION, SOLUTION, CONCENTRATE INTRAVENOUS at 09:07

## 2017-01-01 RX ADMIN — CHLORHEXIDINE GLUCONATE 15 ML: 1.2 RINSE ORAL at 18:54

## 2017-01-01 RX ADMIN — MUPIROCIN: 20 OINTMENT TOPICAL at 09:13

## 2017-01-01 RX ADMIN — PROPOFOL 5 MCG/KG/MIN: 10 INJECTION, EMULSION INTRAVENOUS at 15:02

## 2017-01-01 RX ADMIN — MORPHINE SULFATE 8 MG: 4 INJECTION, SOLUTION INTRAMUSCULAR; INTRAVENOUS at 00:09

## 2017-01-01 RX ADMIN — GADOTERIDOL 16 ML: 279.3 INJECTION, SOLUTION INTRAVENOUS at 10:22

## 2017-01-01 RX ADMIN — SODIUM CHLORIDE: 9 INJECTION, SOLUTION INTRAVENOUS at 16:17

## 2017-01-01 RX ADMIN — MANNITOL 50 G: 20 INJECTION, SOLUTION INTRAVENOUS at 16:11

## 2017-01-01 RX ADMIN — ETOMIDATE 20 MG: 2 INJECTION, SOLUTION INTRAVENOUS at 14:47

## 2017-01-01 RX ADMIN — LEVETIRACETAM 500 MG: 100 INJECTION, SOLUTION, CONCENTRATE INTRAVENOUS at 23:14

## 2017-01-01 RX ADMIN — MORPHINE SULFATE 8 MG: 4 INJECTION, SOLUTION INTRAMUSCULAR; INTRAVENOUS at 23:19

## 2017-01-01 RX ADMIN — Medication 2 G: at 17:19

## 2017-01-01 RX ADMIN — ROCURONIUM BROMIDE 50 MG: 10 INJECTION INTRAVENOUS at 14:50

## 2017-01-01 RX ADMIN — CEFEPIME HYDROCHLORIDE 2 G: 2 INJECTION, POWDER, FOR SOLUTION INTRAVENOUS at 08:17

## 2017-01-01 RX ADMIN — MORPHINE SULFATE 4 MG: 4 INJECTION, SOLUTION INTRAMUSCULAR; INTRAVENOUS at 22:39

## 2017-01-01 RX ADMIN — SODIUM CHLORIDE 40 MG: 9 INJECTION INTRAMUSCULAR; INTRAVENOUS; SUBCUTANEOUS at 11:59

## 2017-01-01 RX ADMIN — ALBUMIN HUMAN 250 ML: 50 SOLUTION INTRAVENOUS at 18:35

## 2017-01-01 RX ADMIN — CEFEPIME HYDROCHLORIDE 2 G: 2 INJECTION, POWDER, FOR SOLUTION INTRAVENOUS at 16:30

## 2017-01-01 RX ADMIN — MORPHINE SULFATE 8 MG: 4 INJECTION, SOLUTION INTRAMUSCULAR; INTRAVENOUS at 01:13

## 2017-01-01 RX ADMIN — SODIUM CHLORIDE AND POTASSIUM CHLORIDE: 9; 1.49 INJECTION, SOLUTION INTRAVENOUS at 14:41

## 2017-01-01 RX ADMIN — Medication 10 ML: at 10:34

## 2017-01-01 RX ADMIN — ACETAMINOPHEN 975 MG: 650 SUPPOSITORY RECTAL at 14:37

## 2017-01-01 RX ADMIN — DEXAMETHASONE SODIUM PHOSPHATE 4 MG: 4 INJECTION, SOLUTION INTRA-ARTICULAR; INTRALESIONAL; INTRAMUSCULAR; INTRAVENOUS; SOFT TISSUE at 19:18

## 2017-01-01 RX ADMIN — MUPIROCIN: 20 OINTMENT TOPICAL at 18:54

## 2017-04-20 NOTE — TELEPHONE ENCOUNTER
Pt is requesting a Rx for muscle spasms. Please contact pt at [phone].        Message received & copied from [facility]

## 2017-04-21 NOTE — PROGRESS NOTES
Reviewed record in preparation for visit and have obtained necessary documentation. Identified pt with two pt identifiers(name and ). No chief complaint on file. There are no preventive care reminders to display for this patient. Mr. Christine Rodriguez has a reminder for a \"due or due soon\" health maintenance. I have asked that he discuss health maintenance topic(s) due with His  primary care provider. Coordination of Care Questionnaire:  :     1) Have you been to an emergency room, urgent care clinic since your last visit? no   Hospitalized since your last visit? no             2) Have you seen or consulted any other health care providers outside of [address] [place] [address] Drive since your last visit? no  (Include any pap smears or colon screenings in this section.)      Patient is accompanied by self I have received verbal consent from Shu Servin III to discuss any/all medical information while they are present in the room.

## 2017-04-21 NOTE — TELEPHONE ENCOUNTER
Patient states he began having severe neck spasms starting Wednesday evening. He cannot turn or move hishead without pain. Pain radiating down both shoulders and tops of arms. Lying down is not comfortable either.  No numbness, tingling, SOB, or chest pain. Thursday spiked a 101 fever, fever subsided with Tylenol. Advised patient he needs to be evaluated for these symptoms.  Appt confirmed for today Friday, April 21, 2017 03:30 PM.

## 2017-04-21 NOTE — PROGRESS NOTES
Carri Noel III is a 62 y.o. male who presents for evaluation of neck pain with paresthesias to both shoulders. Onset wed afternoon. Denies any trauma. Went for a jog and did yard work on Saturday. Thinks had low grade temp yesterday, but none since. No rashes. No weakness in hands or arms, though his rom is quite reduced. No photophobia or noise intolerance. ROS:  Constitutional: negative for fevers, chills, anorexia and weight loss  Eyes:   negative for visual disturbance and irritation  ENT:   negative for tinnitus,sore throat,nasal congestion,ear pain,hoarseness  Respiratory:  negative for cough, hemoptysis, dyspnea,wheezing  CV:   negative for chest pain, palpitations, lower extremity edema  GI:   negative for nausea, vomiting, diarrhea, abdominal pain,melena  Genitourinary: negative for frequency, dysuria and hematuria  Musculoskel: negative for myalgias, arthralgias, back pain, muscle weakness, joint pain. ++neck pain  Neurological:  negative for headaches, dizziness, focal weakness, numbness  Psychiatric:     Negative for depression or anxiety      History reviewed. No pertinent past medical history. Past Surgical History:   Procedure Laterality Date    HX HERNIA REPAIR      HX TONSILLECTOMY         Family History   Problem Relation Age of Onset    Cancer Mother      colon cancer    COPD Father     Heart Failure Father     Cancer Father      prostate cancer    Heart Disease Father      viral cardiomypathy    Breast Cancer Sister        Social History     Social History    Marital status:      Spouse name: N/A    Number of children: N/A    Years of education: N/A     Occupational History    Not on file.      Social History Main Topics    Smoking status: Former Smoker     Quit date: 1/1/1985    Smokeless tobacco: Never Used    Alcohol use Yes      Comment: occasional    Drug use: No    Sexual activity: Not on file     Other Topics Concern    Not on file Social History Narrative            Visit Vitals    BP (!) 151/95 (BP 1 Location: Left arm, BP Patient Position: Sitting)    Pulse 95    Temp 98.9 °F (37.2 °C) (Oral)    Resp 16    Ht 6' (1.829 m)    Wt 184 lb (83.5 kg)    SpO2 98%    BMI 24.95 kg/m2       Physical Examination:   General - Well appearing male  HEENT - PERRL, TM no erythema/opacification, normal nasal turbinates, no oropharyngeal erythema or exudate, MMM  Neck - supple, no bruits, no thyroidomegaly, no lymphadenopathy. Significant decreased rom in all directions. Pulm - clear to auscultation bilaterally  Cardio - RRR, normal S1 S2, no murmur  Abd - soft, nontender, no masses, no HSM  Extrem - no edema, +2 distal pulses  Neuro-  No focal deficits, CN intact     Assessment/Plan:    1. Cervical neck strain--etiology unclear. rx for prednisone and valium. Exercises/stretches given. If develops any fevers, ams, rash, or weakness, advised to go to [facility] ed for further evaluation. Don't think this is bacterial meningitis, but if any of the prior develops, he would warrant spinal tap. Mri c spine also ordered. He has follow up with regular appt with dr Jamarcus Winter in 2 weeks.         Gumaro Mota III, DO

## 2017-04-21 NOTE — PATIENT INSTRUCTIONS
Neck: Exercises  Your Care Instructions  Here are some examples of typical rehabilitation exercises for your condition. Start each exercise slowly. Ease off the exercise if you start to have pain. Your doctor or physical therapist will tell you when you can start these exercises and which ones will work best for you. How to do the exercises  Note: Stretching should make you feel a gentle stretch, but no pain. Stop any strengthening exercise that makes pain worse. Neck stretch    1. This stretch works best if you keep your shoulder down as you lean away from it. To help you remember to do this, start by relaxing your shoulders and lightly holding on to your thighs or your chair. 2. Tilt your head toward your shoulder and hold for 15 to 30 seconds. Let the weight of your head stretch your muscles. 3. If you would like a little added stretch, use your hand to gently and steadily pull your head toward your shoulder. For example, keeping your right shoulder down, lean your head to the left. 4. Repeat 2 to 4 times toward each shoulder. Diagonal neck stretch    1. Turn your head slightly toward the direction you will be stretching, and tilt your head diagonally toward your chest and hold for 15 to 30 seconds. 2. If you would like a little added stretch, use your hand to gently and steadily pull your head forward on the diagonal.  3. Repeat 2 to 4 times toward each side. Dorsal glide stretch    1. Sit or stand tall and look straight ahead. 2. Slowly tuck your chin as you glide your head backward over your body  3. Hold for a count of 6, and then relax for up to 10 seconds. 4. Repeat 8 to 12 times. Note: The dorsal glide stretches the back of the neck. If you feel pain, do not glide so far back. Some people find this exercise easier to do while lying on their backs with an ice pack on the neck. Chest and shoulder stretch    1.  Sit or stand tall and glide your head backward as in the dorsal glide stretch. 2. Raise both arms so that your hands are next to your ears. 3. Take a deep breath, and as you breathe out, lower your elbows down and behind your back. You will feel your shoulder blades slide down and together, and at the same time you will feel a stretch across your chest and the front of your shoulders. 4. Hold for about 6 seconds, and then relax for up to 10 seconds. 5. Repeat 8 to 12 times. Strengthening: Hands on head    1. Move your head backward, forward, and side to side against gentle pressure from your hands, holding each position for about 6 seconds. 2. Repeat 8 to 12 times. Follow-up care is a key part of your treatment and safety. Be sure to make and go to all appointments, and call your doctor if you are having problems. It's also a good idea to know your test results and keep a list of the medicines you take. Where can you learn more? Go to http://rashida-lupe.info/. Enter P975 in the search box to learn more about \"Neck: Exercises. \"  Current as of: May 23, 2016  Content Version: 11.2  © 8454-1011 [email or web address]. Care instructions adapted under license by [employer] (which disclaims liability or warranty for this information). If you have questions about a medical condition or this instruction, always ask your healthcare professional. [address] any warranty or liability for your use of this information. Neck Pain: Care Instructions  Your Care Instructions  You can have neck pain anywhere from the bottom of your head to the top of your shoulders. It can spread to the upper back or arms. Injuries, painting a ceiling, sleeping with your neck twisted, staying in one position for too long, and many other activities can cause neck pain. Most neck pain gets better with home care. Your doctor may recommend medicine to relieve pain or relax your muscles.  He or she may suggest exercise and physical therapy to increase flexibility and relieve stress. You may need to wear a special (cervical) collar to support your neck for a day or two. Follow-up care is a key part of your treatment and safety. Be sure to make and go to all appointments, and call your doctor if you are having problems. It's also a good idea to know your test results and keep a list of the medicines you take. How can you care for yourself at home? · Try using a heating pad on a low or medium setting for 15 to 20 minutes every 2 or 3 hours. Try a warm shower in place of one session with the heating pad. · You can also try an ice pack for 10 to 15 minutes every 2 to 3 hours. Put a thin cloth between the ice and your skin. · Take pain medicines exactly as directed. ¨ If the doctor gave you a prescription medicine for pain, take it as prescribed. ¨ If you are not taking a prescription pain medicine, ask your doctor if you can take an over-the-counter medicine. · If your doctor recommends a cervical collar, wear it exactly as directed. When should you call for help? Call your doctor now or seek immediate medical care if:  · You have new or worsening numbness in your arms, buttocks or legs. · You have new or worsening weakness in your arms or legs. (This could make it hard to stand up.)  · You lose control of your bladder or bowels. Watch closely for changes in your health, and be sure to contact your doctor if:  · Your neck pain is getting worse. · You are not getting better after 1 week. · You do not get better as expected. Where can you learn more? Go to http://rashida-lupe.info/. Enter [id number] in the search box to learn more about \"Neck Pain: Care Instructions. \"  Current as of: May 23, 2016  Content Version: 11.2  © 0713-0186 [employer]. Care instructions adapted under license by [employer] (which disclaims liability or warranty for this information).  If you have questions about a medical condition or this instruction, always ask your healthcare professional. [place] [address] any warranty or liability for your use of this information. If you develop any fevers, become confused, develop a rash, or have any weakness, would go to [facility] ED for further evaluation.

## 2017-04-21 NOTE — MR AVS SNAPSHOT
Visit Information Date & Time Provider Department Dept. Phone Encounter #  
 4/21/2017  3:30 PM Marcelina Angelucci, [address] [id number] Follow-up Instructions Return if symptoms worsen or fail to improve. Your Appointments 5/12/2017  8:15 AM  
ROUTINE CARE with Bruna Carlson, [address] [facility]) Appt Note: 6 month f/u HTN-HLD-PSA//ov 10/21/16  
 [facility] [address] [id number]  
[address] [facility] [address] [place] [address] Upcoming Health Maintenance Date Due COLONOSCOPY 1/25/2018 DTaP/Tdap/Td series (2 - Td) 9/7/2022 Allergies as of 4/21/2017  Review Complete On: 4/21/2017 By: [address] III, DO Severity Noted Reaction Type Reactions Pcn [Penicillins]  09/10/2009    Anaphylaxis Current Immunizations  Never Reviewed Name Date Influenza Vaccine PF 9/26/2014 TDAP Vaccine 9/7/2012 Zoster Vaccine, Live 10/1/2013 Not reviewed this visit You Were Diagnosed With   
  
 Codes Comments Cervical spine pain    -  Primary ICD-10-CM: M54.2 ICD-9-CM: 723.1 Cervical paraspinal muscle spasm     ICD-10-CM: G52.835 ICD-9-CM: 728.85 Vitals BP Pulse Temp Resp Height(growth percentile) Weight(growth percentile) (!) 151/95 (BP 1 Location: Left arm, BP Patient Position: Sitting) 95 98.9 °F (37.2 °C) (Oral) 16 6' (1.829 m) 184 lb (83.5 kg) SpO2 BMI Smoking Status 98% 24.95 kg/m2 Former Smoker Vitals History BMI and BSA Data Body Mass Index Body Surface Area 24.95 kg/m 2 2.06 m 2 Preferred Pharmacy Pharmacy Name Phone [facility] [address], [address] [facility] Earlene Carrero [phone] Your Updated Medication List  
  
   
This list is accurate as of: 4/21/17  4:33 PM.  Always use your most recent med list.  
  
  
  
  
 atorvastatin 40 mg tablet Commonly known as:  LIPITOR  
TAKE 1 TABLET DAILY CENTRUM PO Take  by mouth. chlorpheniramine-HYDROcodone 10-8 mg/5 mL suspension Commonly known as:  Siegel Hu Take 5 mL by mouth every twelve (12) hours as needed for Cough. Max Daily Amount: 10 mL. diazePAM 2 mg tablet Commonly known as:  VALIUM Take 1-2 Tabs by mouth nightly as needed. Max Daily Amount: 4 mg. Indications: MUSCLE SPASM  
  
 fexofenadine 180 mg tablet Commonly known as:  Sharin Harp Take 1 Tab by mouth daily. FISH OIL 1,000 mg Cap Generic drug:  omega-3 fatty acids-vitamin e Take 1 Cap by mouth. GLUCOSAMINE &CHONDROIT-MV-MIN3 PO Take  by mouth.  
  
 lisinopril 10 mg tablet Commonly known as:  PRINIVIL, ZESTRIL  
TAKE 1 TABLET DAILY  
  
 NASONEX 50 mcg/actuation nasal spray Generic drug:  mometasone USE 2 SPRAYS NASALLY DAILY AS DIRECTED  
  
 omeprazole 20 mg capsule Commonly known as:  PRILOSEC  
TAKE 1 CAPSULE TWICE A DAY  
  
 predniSONE 20 mg tablet Commonly known as:  DELTASONE  
60 mg daily x 3 days, then 40 mg daily x 3 days, then 20 mg daily x 3 days, then stop.  
  
 amada's wort 600 mg Cap Take  by mouth. Prescriptions Printed Refills  
 diazePAM (VALIUM) 2 mg tablet 0 Sig: Take 1-2 Tabs by mouth nightly as needed. Max Daily Amount: 4 mg. Indications: MUSCLE SPASM Class: Print Route: Oral  
  
Prescriptions Sent to Pharmacy Refills  
 predniSONE (DELTASONE) 20 mg tablet 0 Si mg daily x 3 days, then 40 mg daily x 3 days, then 20 mg daily x 3 days, then stop. Class: Normal  
 Pharmacy: Marika Mercado [address] [place] [address] Dr Jimenez, [address]  [address] Ph #: [phone] Follow-up Instructions Return if symptoms worsen or fail to improve. To-Do List   
 2017 Imaging:  MRI CERV SPINE WO CONT Patient Instructions Neck: Exercises Your Care Instructions Here are some examples of typical rehabilitation exercises for your condition. Start each exercise slowly. Ease off the exercise if you start to have pain. Your doctor or physical therapist will tell you when you can start these exercises and which ones will work best for you. How to do the exercises Note: Stretching should make you feel a gentle stretch, but no pain. Stop any strengthening exercise that makes pain worse. Neck stretch 1. This stretch works best if you keep your shoulder down as you lean away from it. To help you remember to do this, start by relaxing your shoulders and lightly holding on to your thighs or your chair. 2. Tilt your head toward your shoulder and hold for 15 to 30 seconds. Let the weight of your head stretch your muscles. 3. If you would like a little added stretch, use your hand to gently and steadily pull your head toward your shoulder. For example, keeping your right shoulder down, lean your head to the left. 4. Repeat 2 to 4 times toward each shoulder. Diagonal neck stretch 1. Turn your head slightly toward the direction you will be stretching, and tilt your head diagonally toward your chest and hold for 15 to 30 seconds. 2. If you would like a little added stretch, use your hand to gently and steadily pull your head forward on the diagonal. 
3. Repeat 2 to 4 times toward each side. Dorsal glide stretch 1. Sit or stand tall and look straight ahead. 2. Slowly tuck your chin as you glide your head backward over your body 3. Hold for a count of 6, and then relax for up to 10 seconds. 4. Repeat 8 to 12 times. Note: The dorsal glide stretches the back of the neck. If you feel pain, do not glide so far back. Some people find this exercise easier to do while lying on their backs with an ice pack on the neck. Chest and shoulder stretch 1. Sit or stand tall and glide your head backward as in the dorsal glide stretch. 2. Raise both arms so that your hands are next to your ears. 3. Take a deep breath, and as you breathe out, lower your elbows down and behind your back. You will feel your shoulder blades slide down and together, and at the same time you will feel a stretch across your chest and the front of your shoulders. 4. Hold for about 6 seconds, and then relax for up to 10 seconds. 5. Repeat 8 to 12 times. Strengthening: Hands on head 1. Move your head backward, forward, and side to side against gentle pressure from your hands, holding each position for about 6 seconds. 2. Repeat 8 to 12 times. Follow-up care is a key part of your treatment and safety. Be sure to make and go to all appointments, and call your doctor if you are having problems. It's also a good idea to know your test results and keep a list of the medicines you take. Where can you learn more? Go to http://rashida-lupe.info/. Enter P975 in the search box to learn more about \"Neck: Exercises. \" Current as of: May 23, 2016 Content Version: 11.2 © 5803-5419 [employer]. Care instructions adapted under license by [employer] (which disclaims liability or warranty for this information). If you have questions about a medical condition or this instruction, always ask your healthcare professional. [address] any warranty or liability for your use of this information. Neck Pain: Care Instructions Your Care Instructions You can have neck pain anywhere from the bottom of your head to the top of your shoulders. It can spread to the upper back or arms. Injuries, painting a ceiling, sleeping with your neck twisted, staying in one position for too long, and many other activities can cause neck pain. Most neck pain gets better with home care. Your doctor may recommend medicine to relieve pain or relax your muscles.  He or she may suggest exercise and physical therapy to increase flexibility and relieve stress. You may need to wear a special (cervical) collar to support your neck for a day or two. Follow-up care is a key part of your treatment and safety. Be sure to make and go to all appointments, and call your doctor if you are having problems. It's also a good idea to know your test results and keep a list of the medicines you take. How can you care for yourself at home? · Try using a heating pad on a low or medium setting for 15 to 20 minutes every 2 or 3 hours. Try a warm shower in place of one session with the heating pad. · You can also try an ice pack for 10 to 15 minutes every 2 to 3 hours. Put a thin cloth between the ice and your skin. · Take pain medicines exactly as directed. ¨ If the doctor gave you a prescription medicine for pain, take it as prescribed. ¨ If you are not taking a prescription pain medicine, ask your doctor if you can take an over-the-counter medicine. · If your doctor recommends a cervical collar, wear it exactly as directed. When should you call for help? Call your doctor now or seek immediate medical care if: 
· You have new or worsening numbness in your arms, buttocks or legs. · You have new or worsening weakness in your arms or legs. (This could make it hard to stand up.) · You lose control of your bladder or bowels. Watch closely for changes in your health, and be sure to contact your doctor if: 
· Your neck pain is getting worse. · You are not getting better after 1 week. · You do not get better as expected. Where can you learn more? Go to http://rashida-lupe.info/. Enter [id number] in the search box to learn more about \"Neck Pain: Care Instructions. \" Current as of: May 23, 2016 Content Version: 11.2 © 7847-4423 [employer].  Care instructions adapted under license by [employer] (which disclaims liability or warranty for this information). If you have questions about a medical condition or this instruction, always ask your healthcare professional. [address] any warranty or liability for your use of this information. If you develop any fevers, become confused, develop a rash, or have any weakness, would go to [facility] ED for further evaluation. Introducing [facility]! Dear Mireille Shell: Thank you for requesting a Snapkin account. Our records indicate that you already have an active Snapkin account. You can access your account anytime at [email or web address] Did you know that you can access your hospital and ER discharge instructions at any time in Snapkin? You can also review all of your test results from your hospital stay or ER visit. Additional Information If you have questions, please visit the Frequently Asked Questions section of the Snapkin website at [email or web address] Remember, Snapkin is NOT to be used for urgent needs. For medical emergencies, dial 911. Now available from your iPhone and Android! Please provide this summary of care documentation to your next provider. Your primary care clinician is listed as Mari YAÑEZ. If you have any questions after today's visit, please call [phone].

## 2017-04-25 NOTE — PROGRESS NOTES
Reviewed record in preparation for visit and have obtained necessary documentation. Identified pt with two pt identifiers(name and ). Chief Complaint   Patient presents with    Sleep Problem     insomnia    Arm Pain     bilateral       There are no preventive care reminders to display for this patient. Mr. Petra Crespo has a reminder for a \"due or due soon\" health maintenance. I have asked that he discuss health maintenance topic(s) due with His  primary care provider. Coordination of Care Questionnaire:  :     1) Have you been to an emergency room, urgent care clinic since your last visit? no   Hospitalized since your last visit? no             2) Have you seen or consulted any other health care providers outside of [facility] since your last visit? no  (Include any pap smears or colon screenings in this section.)      Patient is accompanied by self I have received verbal consent from Keyana Ram III to discuss any/all medical information while they are present in the room.

## 2017-04-25 NOTE — PROGRESS NOTES
Riley Palafox III is a 62 y.o. male who presents for evaluation of neck strain. Last seen by me April 21 for new onset of above, rx given for prednisone and valium. Has improved some, but still with neck pain and stiffness. No more fevers or temps. No bowel or bladder issues. Needs to get back to work. Slept well Friday night, and felt better, then overdid it on Saturday, played with dog, took out trash etc... ROS:  Constitutional: negative for fevers, chills, anorexia and weight loss  Eyes:   negative for visual disturbance and irritation  ENT:   negative for tinnitus,sore throat,nasal congestion,ear pain,hoarseness  Respiratory:  negative for cough, hemoptysis, dyspnea,wheezing  CV:   negative for chest pain, palpitations, lower extremity edema  GI:   negative for nausea, vomiting, diarrhea, abdominal pain,melena  Genitourinary: negative for frequency, dysuria and hematuria  Musculoskel: negative for myalgias, arthralgias, back pain, muscle weakness, joint pain. ++neck pain  Neurological:  negative for headaches, dizziness, focal weakness, numbness  Psychiatric:     Negative for depression or anxiety      History reviewed. No pertinent past medical history. Past Surgical History:   Procedure Laterality Date    HX HERNIA REPAIR      HX TONSILLECTOMY         Family History   Problem Relation Age of Onset    Cancer Mother      colon cancer    COPD Father     Heart Failure Father     Cancer Father      prostate cancer    Heart Disease Father      viral cardiomypathy    Breast Cancer Sister        Social History     Social History    Marital status:      Spouse name: N/A    Number of children: N/A    Years of education: N/A     Occupational History    Not on file.      Social History Main Topics    Smoking status: Former Smoker     Quit date: 1/1/1985    Smokeless tobacco: Never Used    Alcohol use Yes      Comment: occasional    Drug use: No    Sexual activity: Not on file     Other Topics Concern    Not on file     Social History Narrative            Visit Vitals    BP (!) 171/104 (BP 1 Location: Right arm, BP Patient Position: Sitting)    Pulse (!) 102    Temp 98.2 °F (36.8 °C) (Oral)    Resp 16    Ht 6' (1.829 m)    Wt 185 lb (83.9 kg)    SpO2 98%    BMI 25.09 kg/m2       Physical Examination:   General - Well appearing male  HEENT - PERRL, TM no erythema/opacification, normal nasal turbinates, no oropharyngeal erythema or exudate, MMM  Neck - supple, no bruits, no thyroidomegaly, no lymphadenopathy  Pulm - clear to auscultation bilaterally  Cardio - RRR, normal S1 S2, no murmur  Abd - soft, nontender, no masses, no HSM  Extrem - no edema, +2 distal pulses  Neuro-  No focal deficits, CN intact     Assessment/Plan:    1. Neck strain--etiology unclear, but is getting slowly better with prednisone. Will give another course and increase valium to 5 mg qhs. Referral to PT as well. Has not had mri c spine done yet. rtc prn, has regular visit with dr Bia Becerra for next Friday.         Benton Escobar III, DO

## 2017-04-30 NOTE — BRIEF OP NOTE
BRIEF OPERATIVE NOTE    Date of Procedure: 4/30/2017   Preoperative Diagnosis: INTRACEREBRAL HEMORRHAGE  Postoperative Diagnosis: INTRACEREBRAL HEMORRHAGE-possible BRAIN TUMOR    Procedure(s):  Right parietal craniotomy for evacuation of intracerebral hemorrhage, possible brain tumor  Surgeon(s) and Role:     * Eros Matamoros MD - Primary         A    Anesthesia: General   Estimated Blood Loss: 250  Specimens:   ID Type Source Tests Collected by Time Destination   1 : Tamme 63 TUMOR Preservative Brain  Eros Matamoros MD 4/30/2017 1752 Pathology      Findings: large intracerebral hemorrhage, possible underl;maria guadalupe brain tumor   Complications: none  Implants: * No implants in log *

## 2017-04-30 NOTE — ED PROVIDER NOTES
HPI Comments: Carri Noel III, 62 y.o. male, [facility] [address] of [facility] fevers x 4 days. Per spouse, the patient also c/o confusion that started today. Per spouse, the patient also c/o hip pain, BL flank pain, a headache, neck pain, and generalized body aches. Per spouse, the patient's most recent dose of tylenol was at 0800 today with no relief. Per spouse, the patient is currently on a steroid taper for a cervical strain. Per spouse, the patient has had recent sick contact. Per spouse, the patient is typically A&O x 3 and is talkative. Per spouse, the patient is currently not at his baseline. Per spouse, the patient's systolic BP was within normal limits PTA. Per spouse, the patient denies a history of diabetes, being prescribed anticoagulants, head trauma, or recent travel. Per spouse, the pt specifically denies nausea, vomiting, abdominal pain, or coughing. PCP: Vladimir Alcantara MD    PMHx significant for: HTN, hypercholesterolemia, PUD  PSHx significant for: Umbilical hernia repair  Social history significant for: - Tobacco (former), + EtOH, - Illicit Drug Use    There are no other complaints, changes, or physical findings at this time. Written by AVELINO Dong, as dictated by Tosha Vyas DO. The history is provided by the spouse. No  was used.         Past Medical History:   Diagnosis Date    Hypertension        Past Surgical History:   Procedure Laterality Date    HX HERNIA REPAIR      HX TONSILLECTOMY           Family History:   Problem Relation Age of Onset   Kadeem Gallagher Cancer Mother      colon cancer    COPD Father     Heart Failure Father     Cancer Father      prostate cancer    Heart Disease Father      viral cardiomypathy    Breast Cancer Sister        Social History     Social History    Marital status:      Spouse name: N/A    Number of children: N/A    Years of education: N/A     Occupational History    Not on file. Social History Main Topics    Smoking status: Former Smoker     Quit date: 1/1/1985    Smokeless tobacco: Never Used    Alcohol use Yes      Comment: occasional    Drug use: No    Sexual activity: Not on file     Other Topics Concern    Not on file     Social History Narrative         ALLERGIES: Pcn [penicillins]    Review of Systems   Constitutional: Positive for fever. Negative for chills and fatigue. HENT: Negative for congestion and rhinorrhea. Eyes: Negative for visual disturbance. Respiratory: Negative for cough, shortness of breath and wheezing. Cardiovascular: Negative for chest pain and palpitations. Gastrointestinal: Negative for abdominal distention, abdominal pain, constipation, diarrhea, nausea and vomiting. Endocrine: Negative. Genitourinary: Positive for flank pain. Negative for difficulty urinating and dysuria. Musculoskeletal: Positive for arthralgias (Hip pain), myalgias (Generalized) and neck pain. Skin: Negative for rash. Neurological: Positive for headaches. Negative for dizziness, weakness and light-headedness. Psychiatric/Behavioral: Positive for confusion. Negative for suicidal ideas. Vitals:    04/30/17 1550 04/30/17 1600 04/30/17 1610 04/30/17 1620   BP: (!) 138/115 133/84 143/80 136/77   Pulse: 83 82 86 87   Resp: 21 20 21 21   Temp:       SpO2: 97% 96% 96% 97%   Weight:       Height:                Physical Exam   Constitutional: He appears well-developed and well-nourished. HENT:   Head: Normocephalic and atraumatic. Mouth/Throat: Oropharynx is clear and moist. Mucous membranes are dry. Eyes: Conjunctivae are normal.   Left pupil is 3 mm  Right pupil is 2 mm  Pupils are sluggish and minimally reactive   Neck: No JVD present. No tracheal deviation present. Painful ROM with movement of head and neck   Cardiovascular: Regular rhythm and intact distal pulses. Tachycardia present. Exam reveals no gallop and no friction rub.     No murmur heard. Pulmonary/Chest: Effort normal and breath sounds normal. No stridor. Tachypnea noted. No respiratory distress. He has no wheezes. Abdominal: Soft. Bowel sounds are normal. He exhibits no distension and no mass. There is no tenderness. There is no guarding. Musculoskeletal: Normal range of motion. He exhibits no edema or tenderness. No deformity   Neurological:   Pt is able to move all extremities spontaneously, but not purposefully  Unable to answer questions  Unable to follow commands   Skin: Skin is warm, dry and intact. No rash noted. No peripheral edema, rash, or lesions   Nursing note and vitals reviewed. Written by Sindhu Self ED Scribe, as dictated by Richard Wise DO.    MDM  Number of Diagnoses or Management Options  Elevated liver enzymes:   Intracranial hemorrhage ([facility] [address]):   Sepsis, due to unspecified organism [facility]):   Diagnosis management comments: Pt is febrile, altered. DDx includes sepsis, meningitis, metabolic abnormality, drug toxicity, bartolo. Lower suspicion for acute intracranial process as patient is moving all extremities and with s/s consistent with sepsis. Will get head CT, CXR, labs, blood and urine cultures, uds, lactic acid. If head CT normal, will get LP. Patient is maintaining airway at this time. Amount and/or Complexity of Data Reviewed  Clinical lab tests: ordered and reviewed  Tests in the radiology section of CPT®: ordered and reviewed  Tests in the medicine section of CPT®: ordered and reviewed  Obtain history from someone other than the patient: yes (Spouse)  Review and summarize past medical records: yes  Discuss the patient with other providers: yes (Neurosurgery, Hospitalist)  Independent visualization of images, tracings, or specimens: yes      ED Course       Procedures    EKG interpretation: (Preliminary) 14:00  Rhythm: Sinus rhythm with PACs; and regular . Rate (approx.): 100;  Axis: normal; TN interval: normal; QRS interval: normal ; ST/T wave: No ST changes; Other findings: No significant changes from prior. Written by Darryl Swift ED Scribe, as dictated by Sun Shane DO. Progress Notes:  2:40 PM  The patient has returned from CT and he has agonal breathing and becoming less responsive. The patient started to develop a facial droop. The pt was unable to spontaneously move his extremities as compared to initial evaluation. Per wife, the patient denies being prescribed any anticoagulants or recent head trauma. 2:42 PM - The patient's wife was informed of all of the patient's available lab and imaging results. The pt's wife was also informed of the plan of care, and she is in agreement at this time. Pastoral care and Respiratory Therapy have been paged. Will place the patient on 6L NC. The pt's HR is 76 and is in NSR, his SpO2 is current 91% on RA, BP is 212/94.    2:46 PM - The patient's BP is 214/80. Pt is easy to bag and ventilate at this time. SpO2 is 99% on 6L O2.   2:50 PM - Respiratory is at bedside. Procedure Note - Orotracheal Intubation:   2:54 PM  Performed by: Sun Shane DO   Indication for procedure: impending airway compromise  RSI performed. The patient was sedated with 20 mg of etomidate and 150 mg rocuronium (Zemuron) and orotracheally intubated with a 7.5 cuffed Italian endotracheal tube using a mac blade with direct visualization. Tube was advanced to 22 cm at the lip. ETT location confirmed by bilateral, symmetric breath sounds. Number of attempts: 2  Complications: Visualization  Assistance of glidescope was used. The procedure took 1-15 minutes, and pt tolerated well. Written by Darryl Swift, ED Scribe, as dictated by Sun Shane DO.    2:55 PM - The patient's HR is 121 and he is in sinus tachycardia. His BP is 240/125. Will start him on a propofol drip. Pt's SpO2 is 95% after intubation. Will order an NG tube. Consult Note:  3:01 PM  Sun Shane DO spoke with Alberto Garcia.  Aurelia De Paz MD,  Specialty: Neurosurgery  Discussed pt's hx, disposition, and available diagnostic and imaging results. Reviewed care plans. Consultant agrees with plans as outlined. Dr. Maria Fernanda Yu states that he will evaluate at bedside. Written by AVELINO Spainibnadine, as dictated by Suri Baires DO. Progress Note:  3:40 PM  Dr. Maria Fernanda Yu is at bedside at this time. Dr. Maria Fernanda Yu states that he will take the patient to the OR and requesting the patient be administered Keppra 500 mg and mannitol 50 mg. Written by Nandini Eisenberg ED Scribe, as dictated by Suri Baires DO.    CONSULT NOTE:   3:49 PM  Suri Baires DO spoke with Srinivasan Black MD,   Specialty: Hospitalist  Discussed pt's hx, disposition, and available diagnostic and imaging results. Reviewed care plans. Consultant will evaluate pt for admission. Written by AVELINO Spainibe, as dictated by Suri Baires DO.    CRITICAL CARE NOTE :  4:22 PM  IMPENDING DETERIORATION -Airway, Respiratory, CNS and Metabolic  ASSOCIATED RISK FACTORS - Hypotension, Shock, Hypoxia, Metabolic changes, Vascular Compromise and CNS Decompensation  MANAGEMENT- Bedside Assessment and Supervision of Care  INTERPRETATION -  Xrays, CT Scan, Blood Gases, ECG and Blood Pressure  INTERVENTIONS - hemodynamic mngmt, vent mngmt, gastric tube, vascular control, Neurologic interventions  and Metobolic interventions  CASE REVIEW - Hospitalist, Medical Sub-Specialist, Nursing and Family  TREATMENT RESPONSE -Improved  PERFORMED BY - Self    NOTES   :  I have spent 75 minutes of critical care time involved in lab review, consultations with specialist, family decision- making, bedside attention and documentation. During this entire length of time I was immediately available to the patient .     LABORATORY TESTS:  Recent Results (from the past 12 hour(s))   CBC WITH AUTOMATED DIFF    Collection Time: 04/30/17  1:40 PM   Result Value Ref Range    WBC 28.9 (H) 4.1 - 11.1 K/uL    RBC 4.47 4.10 - 5.70 M/uL    HGB 14.4 12.1 - 17.0 g/dL    HCT 40.9 36.6 - 50.3 %    MCV 91.5 80.0 - 99.0 FL    MCH 32.2 26.0 - 34.0 PG    MCHC 35.2 30.0 - 36.5 g/dL    RDW 13.2 11.5 - 14.5 %    PLATELET 498 628 - 195 K/uL    NEUTROPHILS 82 %    BAND NEUTROPHILS 8 %    LYMPHOCYTES 3 %    MONOCYTES 7 %    EOSINOPHILS 0 %    BASOPHILS 0 %    ABS. NEUTROPHILS 26.0 K/UL    ABS. LYMPHOCYTES 0.9 K/UL    ABS. MONOCYTES 2.0 K/UL    ABS. EOSINOPHILS 0.0 K/UL    ABS. BASOPHILS 0.0 K/UL    RBC COMMENTS NORMOCYTIC, NORMOCHROMIC      DF MANUAL     METABOLIC PANEL, COMPREHENSIVE    Collection Time: 04/30/17  1:40 PM   Result Value Ref Range    Sodium 136 136 - 145 mmol/L    Potassium 3.6 3.5 - 5.1 mmol/L    Chloride 102 97 - 108 mmol/L    CO2 23 21 - 32 mmol/L    Anion gap 11 5 - 15 mmol/L    Glucose 124 (H) 65 - 100 mg/dL    BUN 27 (H) 6 - 20 MG/DL    Creatinine 1.07 0.70 - 1.30 MG/DL    BUN/Creatinine ratio 25 (H) 12 - 20      GFR est AA >60 >60 ml/min/1.73m2    GFR est non-AA >60 >60 ml/min/1.73m2    Calcium 9.3 8.5 - 10.1 MG/DL    Bilirubin, total 1.1 (H) 0.2 - 1.0 MG/DL    ALT (SGPT) 259 (H) 12 - 78 U/L    AST (SGOT) 139 (H) 15 - 37 U/L    Alk.  phosphatase 267 (H) 45 - 117 U/L    Protein, total 7.7 6.4 - 8.2 g/dL    Albumin 2.8 (L) 3.5 - 5.0 g/dL    Globulin 4.9 (H) 2.0 - 4.0 g/dL    A-G Ratio 0.6 (L) 1.1 - 2.2     LACTIC ACID, PLASMA    Collection Time: 04/30/17  1:40 PM   Result Value Ref Range    Lactic acid 2.0 0.4 - 2.0 MMOL/L   URINALYSIS W/ REFLEX CULTURE    Collection Time: 04/30/17  2:16 PM   Result Value Ref Range    Color DARK YELLOW      Appearance CLOUDY (A) CLEAR      Specific gravity 1.023 1.003 - 1.030      pH (UA) 6.0 5.0 - 8.0      Protein 100 (A) NEG mg/dL    Glucose NEGATIVE  NEG mg/dL    Ketone NEGATIVE  NEG mg/dL    Blood LARGE (A) NEG      Urobilinogen 1.0 0.2 - 1.0 EU/dL    Nitrites NEGATIVE  NEG      Leukocyte Esterase SMALL (A) NEG      WBC 5-10 0 - 4 /hpf    RBC 20-50 0 - 5 /hpf    Epithelial cells FEW FEW /lpf Bacteria 1+ (A) NEG /hpf    UA:UC IF INDICATED URINE CULTURE ORDERED (A) CNI      Amorphous Crystals 1+ (A) NEG    Granular cast 0-2 (A) NEG /lpf   BILIRUBIN, CONFIRM    Collection Time: 04/30/17  2:16 PM   Result Value Ref Range    Bilirubin UA, confirm NEGATIVE  NEG     DRUG SCREEN, URINE    Collection Time: 04/30/17  2:16 PM   Result Value Ref Range    AMPHETAMINE NEGATIVE  NEG      BARBITURATES NEGATIVE  NEG      BENZODIAZEPINE POSITIVE (A) NEG      COCAINE NEGATIVE  NEG      METHADONE NEGATIVE  NEG      OPIATES NEGATIVE  NEG      PCP(PHENCYCLIDINE) NEGATIVE  NEG      THC (TH-CANNABINOL) NEGATIVE  NEG      Drug screen comment (NOTE)    BLOOD GAS, ARTERIAL    Collection Time: 04/30/17  3:10 PM   Result Value Ref Range    pH 7.35 7.35 - 7.45      PCO2 43 35.0 - 45.0 mmHg    PO2 272 (H) 80 - 100 mmHg    O2  (H) 92 - 97 %    BICARBONATE 23 22 - 26 mmol/L    BASE DEFICIT 2.3 mmol/L    O2 METHOD VENTILATOR      FIO2 100 %    MODE A/C      Tidal volume 550      SET RATE 16      EPAP/CPAP/PEEP 5.0      Sample source ARTERIAL      SITE RIGHT RADIAL      BASILIA'S TEST YES     PROTHROMBIN TIME + INR    Collection Time: 04/30/17  3:26 PM   Result Value Ref Range    INR 1.4 (H) 0.9 - 1.1      Prothrombin time 14.1 (H) 9.0 - 11.1 sec   PTT    Collection Time: 04/30/17  3:26 PM   Result Value Ref Range    aPTT 33.6 (H) 22.1 - 32.5 sec    aPTT, therapeutic range     58.0 - 77.0 SECS       IMAGING RESULTS:  CT Results  (Last 48 hours)               04/30/17 1428  CT HEAD WO CONT Final result    Impression:  IMPRESSION:    1. Large right superior frontal parietal hematoma. 2. Small left superior frontal hematoma. 3. Tiny focus of potentially subarachnoid blood in left superior frontal region. 4. Mass effect. The findings were called to Dr. Jason Foley on 4/30/2017 at 2:35 PM by myself. [id number]                       Narrative:  EXAM:  CT HEAD WO CONT       INDICATION:   Four-day history of intermittent fevers. Confusion that began   today. COMPARISON: None. TECHNIQUE: Unenhanced CT of the head was performed using 5 mm images. Brain and   bone windows were generated. CT dose reduction was achieved through use of a   standardized protocol tailored for this examination and automatic exposure   control for dose modulation. FINDINGS:   There is a large intra-axial hemorrhage with mild surrounding vasogenic edema in   the superior right frontoparietal region measuring 5.8 cm craniocaudal and 4.7 x   4.2 cm transverse. A small intra-axial hematoma is shown in the left superior   frontal region measuring 1.2 cm craniocaudal and 1.1 x 0.8 cm transverse, also   with mild surrounding vasogenic edema. A tiny focus of increased density,   possibly also hemorrhagic, is shown more anteriorly in the left superior frontal   region along a sulcus which might be of subarachnoid origin. There is generalized effacement of the right cerebral sulci. Moderate   compression of the atrium and posterior body of the right lateral ventricle is   shown as is effacement of the occipital horn. Subfalcine herniation toward the   left is demonstrated measuring 5 mm. No transtentorial herniation is shown. No extra-axial collection or unenhanced CT imaging evidence for acute infarction   is shown. No osseous abnormalities demonstrated. The visualized portions of the   orbits, paranasal sinuses and mastoid air cells are normal.               CXR Results  (Last 48 hours)               04/30/17 1423  XR CHEST PORT Final result    Impression:  Impression:   Underexpansion of lungs with discoid atelectasis in the left base. Narrative:  Chest portable AP       History: Chest pain. Fever. Altered mental status. Comparison: 3/27/2009       Findings: The lungs are less well-expanded than previously seen. Discoid   atelectasis is seen in the left base. No focal consolidation, pleural effusion,   or pneumothorax.   The cardiomediastinal silhouette is unremarkable. There is   mild dextroconvex scoliosis of the thoracic spine. MEDICATIONS GIVEN:  Medications   propofol (DIPRIVAN) infusion (8 mcg/kg/min × 83.9 kg IntraVENous New Bag 4/30/17 1520)   levETIRAcetam (KEPPRA) 500 mg in 0.9% sodium chloride IVPB (not administered)   mannitol (OSMITROL) 20 % infusion 50 g (not administered)   sodium chloride 0.9 % bolus infusion 500 mL (500 mL IntraVENous New Bag 4/30/17 1353)   ondansetron (ZOFRAN) injection 4 mg (4 mg IntraVENous Given 4/30/17 1415)   acetaminophen (TYLENOL) suppository 975 mg (975 mg Rectal Given 4/30/17 1437)   etomidate (AMIDATE) 2 mg/mL injection (20 mg IntraVENous Given 4/30/17 1447)   rocuronium (ZEMURON) injection (50 mg IntraVENous Canceled Entry 4/30/17 1451)       IMPRESSION:  1. Sepsis, due to unspecified organism ([address]    2. Intracranial hemorrhage (HCC)    3. Elevated liver enzymes        PLAN:  1. Admit to Hospitalist    Admit Note:  3:50 PM  Pt is being admitted by Mariama Sandoval MD. The results of their tests and reason(s) for their admission have been discussed with pt and/or available family. They convey agreement and understanding for the need to be admitted and for admission diagnosis. This note is prepared by Keri Bryant, acting as a Scribe for Jessica Taylor DO. Jessica Taylor DO: The scribe's documentation has been prepared under my direction and personally reviewed by me in its entirety. I confirm that the notes above accurately reflects all work, treatment, procedures, and medical decision making performed by me.

## 2017-04-30 NOTE — ANESTHESIA PROCEDURE NOTES
Central Line Placement    Performed by: Krystal Horton  Authorized by: Bettie Palm Line Procedure Note    Indication: Inadequate venous access      Patient positioned in Trendelenburg. 7-Step Sterility Protocol followed. (cap, mask sterile gown, sterile gloves, large sterile sheet, hand hygiene, 2% chlorhexidine for cutaneous antisepsis)     Right internal jugular cannulated x 1 attempt(s) utilizing the Seldinger technique. Catheter secured & Biopatch applied. Sterile Tegaderm placed. CXR pending.     Care turned over to covering Attending MD.

## 2017-04-30 NOTE — ANESTHESIA PROCEDURE NOTES
Arterial Line Placement    Performed by: Louis Chand  Authorized by: Louis Chand     Pre-Procedure      Arterial Line Procedure Note         Sterile prep with Chlorhexidine. Right radial artery cannulated x 1 attempt(s) utilizing the Seldinger technique. Catheter secured. Sterile dressing placed.

## 2017-04-30 NOTE — ED TRIAGE NOTES
Keppra not yet arrived from Pharmacy. Bedside verba report given to CRNABlessing. Patient to OR with surgical team and CRNA.

## 2017-04-30 NOTE — ED NOTES
Patient intubated by Dr. Kelly Howell using # 7.5 ET tube on first attempt and taped at 22 cm at lip. OG tube inserted by Nel Torres RN. Placement verified by auscultation air bolus.

## 2017-04-30 NOTE — ED NOTES
Received phone call from pharmacy regarding allergies. Spoke with pt's wife who reports pt had rash after receiving PCN as a child. Information given to pharmacy.

## 2017-04-30 NOTE — ED NOTES
Assumed care of patient who appears extremely lethargic and is accompanied by his wife who brought him to ED with hx fever since Thursday. He is presently taking his second tapered dose of Prednisone for right shoulder injury and was last seen by PCP on Tuesday. He began experiencing fever and diaphoresis on Thursday for which he was taking tylenol. Hs wife brought him to ED today because fever spiked again and he had AMS. Last dose 8 hr Oral Tylenol at 0800. PAtient is lethargic and moving al extremities but not following commands and not tracking with is eyes. IV placed x 2 by JANIE Vu. Patient had one episode vomiting for which VO was received from Dr. Nidia Rizzo for [address] NS infusing 500 cc/hr x 1 dose. Placed patient on cardiac monitor showing  bpm. SR up x 2. Awaiting further orders.

## 2017-04-30 NOTE — ANESTHESIA PREPROCEDURE EVALUATION
Anesthetic History   No history of anesthetic complications            Review of Systems / Medical History  Patient summary reviewed, nursing notes reviewed and pertinent labs reviewed    Pulmonary  Within defined limits                 Neuro/Psych   Within defined limits           Cardiovascular    Hypertension              Exercise tolerance: >4 METS     GI/Hepatic/Renal     GERD      PUD     Endo/Other  Within defined limits           Other Findings   Comments: Intracranial hematoma           Physical Exam    Airway          Intubated   Cardiovascular  Regular rate and rhythm,  S1 and S2 normal,  no murmur, click, rub, or gallop             Dental  No notable dental hx       Pulmonary  Breath sounds clear to auscultation               Abdominal         Other Findings            Anesthetic Plan    ASA: 4, emergent  Anesthesia type: general    Monitoring Plan: BIS and Arterial line      Induction: Intravenous  Anesthetic plan and risks discussed with: Patient

## 2017-04-30 NOTE — ED NOTES
FiO2 decreased from 100% to 50% by RT. PT/INR obtained via straight stick by [email or web address]. Family at bedside. Condom cath placed by charge RN, Gigi Amin.

## 2017-04-30 NOTE — ED NOTES
Diprivan gtt started at 5 mcg/kg/min. ABG obtained by RT. RIO. SR 99 on cardiac monitor. BP elevated but remains stable.

## 2017-04-30 NOTE — ED NOTES
Phan cath inserted via sterile technichue per VO Dr. Martha Florentino. Patient did not respond during procedure. Nel colored urine obtained.

## 2017-05-01 NOTE — PROGRESS NOTES
2203    Patient received from OR a little before 2000 hours; patient on vent; right IJ triple lumen catheter intact; infusing NS at 25 cc/hr, propofol at 50 mics and phenylephrine drip infusing to maintain MAP greater than 65; right radial a-line and two peripheral IV sites intact; patient has mild cough on sedation when suctioned and pupils equal and reactive to light; in sinus tach; NPO; OG tube to low continuous suction draining small amount of light green fluid; olsen catheter intact draining genaro color urine; patient received two units of FFP on arrival to CCU; patient's family at bedside. Dr. Theron Freitas called and informed of ABG results and order to change AC rate to 12 and add peep 5 cm and ABG and chest x-ray in AM; order received for restraints; portable chest x-ray and KUB done on arrival to CCU. Dr. Kofi Mc called and informed of patient's neuro status and temperature of 102 oral; order received to give tylenol by suppository (done);     0730  Patient' propofol decreased to 10 mics last evening and discontinued by 0400 hours; patient neuro exam when propofol off pupils slightly unequal around 0500 hours with brisk reaction to light; no movement of extremities to pain until around 0630 hours when patient had trace withdraw of right arm; patient had very weak cough off and on during the night to ETT suction; CT scan of head done; Dr. Austin Stoner called around 0600 hours and updated on patient's neuro status and that CT scan of head done; Dr. Paige Oakley informed of patient's neuro status around 0640 hours this AM; patient weaned off phenylephrine drip during the night; ABG done and portable chest x-ray done and lab done this AM; OG tube drained 150 cc this shift and olsen catheter drained 1860 cc of urine this shift; patient's family back to the bedside around 0515 hours this AM; report given to Mark Cooper RN.

## 2017-05-01 NOTE — PROGRESS NOTES
Paged by RN to provide support to family as family made decision for compassionate withdrawal. Family gathered at bedside telling stories, reminiscing of patient's life and joys and sorrows; providing comfort to each other through stories. I provided presence and support; affirmed family's emotions and offered prayer. Wife, son and daughter at bedside. Son Monica Toth has chosen to not be at bedside at this time; affirmed Kris's decision with family; family well-supported by friends and others. Assured family of continued pastoral care support and presence. Pastoral care will continue to follow. [phone]. Visit by: Sepideh Rivera. Alpesh Bales.  Irena Chavez MA, [facility]    Lead  [employer]

## 2017-05-01 NOTE — CDMP QUERY
Dr. Sarah Amin,  Please clarify & documentation the Present on Admission (POA) status for: Sepsis/Septic shock    The medical record reflects the following clinical findings, treatment, and risk factors:    Risk Factors:61m adm w/ ams, intracerebral hemorrhage  Clinical Indicators: lactic 2, wbc 28.9, temp 102.9, hr 129, rr 34, 4/4 GPC in blood cx 5/1 pulm \"sepsis\"  Treatment:Maxipime, Lvq, Abraham, Vanc, ns 500, vent    Please clarify and document your clinical opinion in the progress notes and discharge summary including the definitive and/or presumptive diagnosis, (suspected or probable), related to the above clinical findings. Please include clinical findings supporting your diagnosis.     Thanks,  Robb Armstrong RN/CDMP [phone]

## 2017-05-01 NOTE — PROGRESS NOTES
[facility]  Pulmonary, Critical Care, and Sleep Medicine    Name: Denae Steele MRN: [id number]   : 1955 Hospital: [facility]   Date: 2017        IMPRESSION:   · Acute respiratory failure  · S/P craniotomy for ICH  · Brain tumor  · Sepsis - 4/4 GPC in blood  · Abnormal LFTs  · HTN  · Hyperlipidemia       PLAN:   · Ventilator support for now - not alert enough for extubation (unconscious)  · Neurosurgical eval ongoing  · IV antibiotics  · Follow up final culture data  · Follow up path from surgery  · Check abd ultrasound  · Consider CT C/A/P if malignancy confirmed  · PPI  · DVT prophylaxis with SCDs only     Subjective/Interval History:   I have reviewed the flowsheet and previous days notes. The patient is unable to give any meaningful history or review of systems because the patient is:   Intubated/unresponsive     The patient is critically ill on:      Mechanical ventilation     Review of Systems   Unable to perform ROS: Intubated     Objective:   Vital Signs:    Visit Vitals    /57    Pulse 81    Temp 99.3 °F (37.4 °C)    Resp 26    Ht 6' 1\" (1.854 m)    Wt 81.3 kg (179 lb 3.7 oz)    SpO2 99%    BMI 23.65 kg/m2       O2 Device: Endotracheal tube       Temp (24hrs), Av.6 °F (38.1 °C), Min:99.2 °F (37.3 °C), Max:102.9 °F (39.4 °C)       Intake/Output:   Last shift:         Last 3 shifts:  1901 -  0700  In: 5511.9 [I.V.:5511.9]  Out: 2260 [Urine:1860]    Intake/Output Summary (Last 24 hours) at 17 0843  Last data filed at 17 0700   Gross per 24 hour   Intake          5511.89 ml   Output             2260 ml   Net          3251.89 ml     Hemodynamics:   PAP:   CO:     Wedge:   CI:     CVP:    SVR:       PVR:       Ventilator Settings:  Mode Rate Tidal Volume Pressure FiO2 PEEP   Assist control   470 ml    50 % 5 cm H20     Peak airway pressure: 13 cm H2O    Minute ventilation: 13.1 l/min       Physical Exam Constitutional: He is intubated. HENT:   S/p craniotomy   Eyes: No scleral icterus. Cardiovascular: Normal rate and regular rhythm. No murmur heard. Pulmonary/Chest: He is intubated. No respiratory distress. He has no wheezes. He has no rales. Abdominal: Soft. Bowel sounds are normal. He exhibits no distension. There is no tenderness. Musculoskeletal: He exhibits no edema. Neurological: He is unresponsive. Skin: Skin is warm and dry. No rash noted.      Data:     Current Facility-Administered Medications   Medication Dose Route Frequency    vancomycin (VANCOCIN) 2000 mg in  ml infusion  2,000 mg IntraVENous ONCE    lactated ringers infusion  25 mL/hr IntraVENous CONTINUOUS    sodium chloride (NS) flush 5-10 mL  5-10 mL IntraVENous Q8H    cefepime (MAXIPIME) 2 g in 0.9% sodium chloride (MBP/ADV) 100 mL  2 g IntraVENous Q8H    levoFLOXacin (LEVAQUIN) 750 mg in D5W IVPB  750 mg IntraVENous Q24H    sodium chloride (NS) flush 5-10 mL  5-10 mL IntraVENous Q8H    levETIRAcetam (KEPPRA) 500 mg in 0.9% sodium chloride IVPB  500 mg IntraVENous Q12H    niCARdipine (CARDENE) 25 mg in 0.9% sodium chloride 250 mL infusion  5-15 mg/hr IntraVENous TITRATE    propofol (DIPRIVAN) infusion  5-50 mcg/kg/min IntraVENous TITRATE    propofol (DIPRIVAN) infusion  5-50 mcg/kg/min IntraVENous TITRATE    chlorhexidine (PERIDEX) 0.12 % mouthwash 15 mL  15 mL Oral BID    mupirocin (BACTROBAN) 2 % ointment   Both Nostrils BID    0.9% sodium chloride infusion  25 mL/hr IntraVENous CONTINUOUS    PHENYLephrine (LIZA-SYNEPHRINE) 100 mg/250 ml NS infusion   mcg/min IntraVENous TITRATE    0.9% sodium chloride with KCl 20 mEq/L infusion   IntraVENous CONTINUOUS                Labs:  Recent Labs      05/01/17   0352  04/30/17   1340   WBC  22.1*  28.9*   HGB  10.6*  14.4   HCT  31.4*  40.9   PLT  215  307     Recent Labs      05/01/17   0352  04/30/17   1526  04/30/17   1340   NA  143   --   136   K  3.9   -- 3.6   CL  110*   --   102   CO2  23   --   23   GLU  151*   --   124*   BUN  26*   --   27*   CREA  0.95   --   1.07   CA  8.1*   --   9.3   ALB   --    --   2.8*   TBILI   --    --   1.1*   SGOT   --    --   139*   ALT   --    --   259*   INR  1.2*  1.4*   --      Recent Labs      05/01/17   0537  04/30/17 2029 04/30/17   1840   PH  7.45  7.50*  7.52*   PCO2  31*  29*  25*   PO2  159*  183*  178*   HCO3  21*  22  20*   FIO2  50  50  50     Imaging:  I have personally reviewed the patients radiographs and have reviewed the reports:  Intubated, no acute process - CT head reviewed        Total critical care time exclusive of procedures: 40 minutes  Alannah Boudreaux MD

## 2017-05-01 NOTE — PROGRESS NOTES
[facility] Hospitalist Progress Note    NAME: Kaelyn Gibbs III   :  1955   MRN:  [id number]     Guero Porras MD  Pager number: [phone]    Interim Hospital Summary: 62 y.o. male whom presented on 2017 with      Assessment / Plan:    Large intracerebral hemorrhage with emergent craniotomy  Shock ( not sure of the etiology) with fever tachy with elevated LFT, elevated bili  Requiring pressors r/o septic shock vs, ICH causing the presentation  intubated  S/P Craniotomy  Under neurosurgery care, concern for underlying brain tumour  Follow the serial CT scans    Septic shock  Cont Empiric antibiotics   Follow the blood(Prelim growing GPC in 4 bottles) and urine cultures  Chest xray neg, ua with 1+ bacteria  Target SBP <140 with MAP of 65. On pressors  On keppra     S/P Craniotomy  As above  Pt intubated, on vent. Intensivist following. Hyperlipidemia  Hypertension  Holding all the PTA meds, as above     Very high risk of cardiac arrest.         Code Status: full  Surrogate Decision Maker:wife     DVT Prophylaxis: scds  GI Prophylaxis: not indicated     Baseline: normal functional status       Subjective:     Chief Complaint / Reason for Physician Visit  No new complaints. Discussed with RN events overnight. Review of Systems:  Symptom Y/N Comments  Symptom Y/N Comments   Fever/Chills n   Chest Pain n    Poor Appetite n   Edema n    Cough n   Abdominal Pain n    Sputum n   Joint Pain n    SOB/MAK n   Pruritis/Rash n    Nausea/vomit n   Tolerating PT/OT     Diarrhea n   Tolerating Diet y    Constipation n   Other       Could NOT obtain due to:      Objective:     VITALS:   Last 24hrs VS reviewed since prior progress note.  Most recent are:  Patient Vitals for the past 24 hrs:   Temp Pulse Resp BP SpO2   17 0900 - 74 23 - 99 %   17 0836 - 81 26 - 99 %   17 0800 99.3 °F (37.4 °C) 73 23 116/57 100 %   17 0745 - 76 23 118/56 100 %   05/01/17 0730 - 75 23 117/61 98 %   05/01/17 0715 - 72 23 112/62 100 %   05/01/17 0700 - 72 22 115/58 100 %   05/01/17 0645 - 72 22 118/58 100 %   05/01/17 0630 - 72 23 116/58 100 %   05/01/17 0615 - 74 24 121/60 100 %   05/01/17 0600 - 70 22 109/59 100 %   05/01/17 0545 - 68 20 112/57 100 %   05/01/17 0530 - 71 22 110/59 100 %   05/01/17 0515 - 71 21 109/62 100 %   05/01/17 0500 99.7 °F (37.6 °C) 78 25 125/70 100 %   05/01/17 0445 - 72 22 116/71 100 %   05/01/17 0443 - 73 23 120/66 100 %   05/01/17 0430 - 87 (!) 35 - 100 %   05/01/17 0415 - 69 21 115/61 100 %   05/01/17 0412 99.2 °F (37.3 °C) - - - -   05/01/17 0400 - 72 22 114/63 100 %   05/01/17 0345 - 71 22 114/63 100 %   05/01/17 0330 - 71 22 116/67 99 %   05/01/17 0315 - 70 20 112/67 100 %   05/01/17 0304 - 76 21 - 99 %   05/01/17 0303 - 74 20 - 99 %   05/01/17 0301 - 75 20 120/71 99 %   05/01/17 0230 - 73 22 - 100 %   05/01/17 0228 - 74 21 - 100 %   05/01/17 0215 - 74 22 122/70 100 %   05/01/17 0200 - 75 23 119/76 99 %   05/01/17 0145 - 76 22 116/74 100 %   05/01/17 0130 - 77 24 117/77 99 %   05/01/17 0122 99.5 °F (37.5 °C) - - - -   05/01/17 0120 99.3 °F (37.4 °C) - - - -   05/01/17 0115 - 77 23 122/77 99 %   05/01/17 0100 - 80 24 117/76 99 %   05/01/17 0045 - 83 22 120/72 99 %   05/01/17 0030 - 83 24 119/75 99 %   05/01/17 0015 - 84 23 117/73 99 %   05/01/17 0001 - 83 23 124/75 100 %   05/01/17 0000 - 84 25 - 100 %   04/30/17 2354 - 83 24 123/76 100 %   04/30/17 2346 - 88 25 - 100 %   04/30/17 2345 - 83 24 124/78 100 %   04/30/17 2330 - 81 21 112/72 100 %   04/30/17 2315 - 82 23 114/71 100 %   04/30/17 2300 - 86 22 115/64 100 %   04/30/17 2245 - 89 22 111/71 100 %   04/30/17 2240 99.2 °F (37.3 °C) - - - -   04/30/17 2239 (!) 102 °F (38.9 °C) - - - -   04/30/17 2230 - 88 23 113/70 100 %   04/30/17 2215 - 89 22 110/67 100 %   04/30/17 2200 (!) 100.6 °F (38.1 °C) 89 18 106/69 100 %   04/30/17 2145 - 89 19 104/68 100 %   04/30/17 2130 - 88 18 103/64 100 %   04/30/17 2115 - 89 18 106/65 100 %   04/30/17 2100 - 92 20 100/61 100 %   04/30/17 2055 (!) 100.9 °F (38.3 °C) 94 18 - 100 %   04/30/17 2045 (!) 100.7 °F (38.2 °C) 96 18 101/63 100 %   04/30/17 2035 - 98 19 - 100 %   04/30/17 2031 - 97 20 - 100 %   04/30/17 2030 (!) 101 °F (38.3 °C) 98 19 97/68 100 %   04/30/17 2028 - 100 19 97/71 99 %   04/30/17 2016 - 100 17 - 99 %   04/30/17 2015 (!) 101 °F (38.3 °C) 100 18 92/66 99 %   04/30/17 2000 (!) 101 °F (38.3 °C) (!) 103 19 96/63 100 %   04/30/17 1953 (!) 102 °F (38.9 °C) (!) 102 16 98/62 100 %   04/30/17 1948 - 95 16 - 100 %   04/30/17 1947 - - - 96/51 -   04/30/17 1941 - 95 16 - 100 %   04/30/17 1620 - 87 21 136/77 97 %   04/30/17 1610 - 86 21 143/80 96 %   04/30/17 1600 - 82 20 133/84 96 %   04/30/17 1550 - 83 21 (!) 138/115 97 %   04/30/17 1540 - 79 21 (!) 148/101 97 %   04/30/17 1530 - 94 21 (!) 165/91 96 %   04/30/17 1515 - 90 16 167/86 -   04/30/17 1505 - 97 16 (!) 172/100 -   04/30/17 1500 - (!) 122 16 (!) 199/91 -   04/30/17 1459 - (!) 120 16 - 100 %   04/30/17 1455 - (!) 109 17 198/85 97 %   04/30/17 1450 - 76 27 (!) 240/125 100 %   04/30/17 1449 - 71 27 (!) 229/108 100 %   04/30/17 1445 - 76 26 (!) 214/80 96 %   04/30/17 1440 - 86 26 (!) 212/94 94 %   04/30/17 1437 - 95 (!) 34 (!) 194/105 -   04/30/17 1412 - (!) 104 (!) 33 - 93 %   04/30/17 1405 - (!) 106 22 - -   04/30/17 1329 (!) 102.9 °F (39.4 °C) (!) 128 28 (!) 171/101 -       Intake/Output Summary (Last 24 hours) at 05/01/17 0947  Last data filed at 05/01/17 0900   Gross per 24 hour   Intake          5511.89 ml   Output             2410 ml   Net          3101.89 ml        PHYSICAL EXAM:  General: Intubated, no acute distress    EENT:  EOMI. Anicteric sclerae. MMM  Resp:  CTA bilaterally, no wheezing or rales. No accessory muscle use  CV:  Regular  rhythm,  No edema  GI:  Soft, Non distended, Non tender.  +Bowel sounds  Neurologic:  Intubated  Psych:   Intubated  Skin:  No rashes.   No jaundice    Reviewed most current lab test results and cultures  YES  Reviewed most current radiology test results   YES  Review and summation of old records today    NO  Reviewed patient's current orders and MAR    YES  PMH/SH reviewed - no change compared to H&P  ________________________________________________________________________  Care Plan discussed with:    Comments   Patient x    Family      RN x    Care Manager x    Consultant                        Multidiciplinary team rounds were held today with , nursing, pharmacist and clinical coordinator. Patient's plan of care was discussed; medications were reviewed and discharge planning was addressed. ________________________________________________________________________  Total NON critical care TIME:  35   Minutes    Total CRITICAL CARE TIME Spent:   Minutes non procedure based      Comments   >50% of visit spent in counseling and coordination of care     ________________________________________________________________________  Emily Barron MD     Procedures: see electronic medical records for all procedures/Xrays and details which were not copied into this note but were reviewed prior to creation of Plan. LABS:  I reviewed today's most current labs and imaging studies.   Pertinent labs include:  Recent Labs      05/01/17   0352  04/30/17   1340   WBC  22.1*  28.9*   HGB  10.6*  14.4   HCT  31.4*  40.9   PLT  215  307     Recent Labs      05/01/17   0352  04/30/17   1526  04/30/17   1340   NA  143   --   136   K  3.9   --   3.6   CL  110*   --   102   CO2  23   --   23   GLU  151*   --   124*   BUN  26*   --   27*   CREA  0.95   --   1.07   CA  8.1*   --   9.3   ALB   --    --   2.8*   TBILI   --    --   1.1*   SGOT   --    --   139*   ALT   --    --   259*   INR  1.2*  1.4*   --

## 2017-05-01 NOTE — PROGRESS NOTES
Pharmacy Automatic Renal Dosing Protocol - Antimicrobials    Indication for Antimicrobials: Sepsis (GPC bacteremia)    Current Regimen of Each Antimicrobial (Start Day & Day of Therapy):  Levofloxacin 750 mg IV every 24 hours (Started 17; Day #1)  Cefepime 2 gm IV every 8 hours (Started 17; Day #2)  Vancomycin dosed by pharmacy (Started 17; Day #1)    Goal Vancomycin Trough: 15-20 mcg/mL    Significant Cultures:   17 Urine culture = Results pending  17 Blood culture = GPC in cluster in  (Results pending)    CAPD, Hemodialysis or Renal Replacement Therapy: N/A    Paralysis, amputations, malnutrition: N/A    Recent Labs      17   0352  17   1340   CREA  0.95  1.07   BUN  26*  27*   WBC  22.1*  28.9*     Temp (24hrs), Av.4 °F (38 °C), Min:98.4 °F (36.9 °C), Max:102.9 °F (39.4 °C)    Creatinine Clearance: Greater than 90 mL/min    Impression/Plan:   - Cefepime dosed appropriately based on indication and renal function. Continue current regimen.  - Levofloxacin dosed appropriately based on indication and renal function. Continue current regimen. - Vancomycin loading dose of 2000 mg IV ordered and a vancomycin maintenance dose of 1500 mg IV every 12 hours ordered. Pharmacy will follow daily and adjust medications as appropriate for renal function and/or serum levels.     Thank you,  Familia Eden, PHARMD

## 2017-05-01 NOTE — PROGRESS NOTES
Attended Interdisciplinary rounds in Critical Care Unit, where patient care was discussed. Visit by: Ari Bellamy. Vane Price.  Nanci Guevara MA, [address] [place] [address]

## 2017-05-01 NOTE — ANESTHESIA POSTPROCEDURE EVALUATION
Post-Anesthesia Evaluation and Assessment    Patient: Aleksandra De La Rosa MRN: [id number]  SSN: [id number]    YOB: 1955  Age: 62 y.o. Sex: male       Cardiovascular Function/Vital Signs  Visit Vitals    /63    Pulse 94    Temp (!) 38.3 °C (100.9 °F)    Resp 18    Ht 6' 1\" (1.854 m)    Wt 81.3 kg (179 lb 3.7 oz)    SpO2 100%    BMI 23.65 kg/m2       Patient is status post general anesthesia for Procedure(s):  CRANIOTOMY. Nausea/Vomiting: None    Postoperative hydration reviewed and adequate. Pain:  Pain Scale 1: Numeric (0 - 10) (04/30/17 1329)   Managed    Neurological Status:   Neuro  Neurologic State: Lethargic;Responds to noxious stimuli only (Notified MD need to intubate) (04/30/17 1445)  Orientation Level: Unable to verbalize (04/30/17 1440)  Cognition: No command following (04/30/17 1440)  Speech: Other (comment) (04/30/17 1440)  Assessment L Pupil: Round;Brisk (04/30/17 1440)  Size L Pupil (mm): 3 (04/30/17 1440)  Assessment R Pupil: Round;Sluggish (04/30/17 1440)  Size R Pupil (mm): 2 (04/30/17 1440)  RUE Motor Response: Purposeful (04/30/17 1400)   At baseline    Mental Status and Level of Consciousness: sedated with propofol    Pulmonary Status:   O2 Device:  (intubated) (04/30/17 1953)   Adequate oxygenation and airway patent    Complications related to anesthesia: None    Post-anesthesia assessment completed.  No concerns    Signed By: Patti Dickerson MD     April 30, 2017

## 2017-05-01 NOTE — PROGRESS NOTES
[id number]: Patient transported to CT via HT 50  Ventilator. SPO2 100%. Patient tolerated well. 0256: Patient back in Room 2524, placed back on [address] ventilator. SPO2 99%.

## 2017-05-01 NOTE — PROGRESS NOTES
Dr. Dale Goes in to see patient and spoke with patients family. 1585 Dr. Tamera West in to see patient. 3791 Patient to MRI with portable vent and on monitor. 1000 Dr. Eliza Mesa down to MRI to see patient. 1044 Patient back to room from MRI. Dr. Eliza Mesa in to see patient and spoke with patients family. 18 wife, children and family in with patient and wishing to extubate to comfort care. Wife requesting autopsy and nursing supervisor consulted to determine which tubes/lines remain intact. Nursing supervisor, Agusto Reynolds, spoke with pathologist, Katlyn Hankins, who conferred that the tubes (including ET tube) were routinely left in for autopsy. 200 wife wanting autopsy, but not wanting ET tube left in place if at all possible. Faith Samson, pathologist paged. [address] with Dr. Katlyn Hankins [phone], pathologist, and explained patients wife's request. Dr. Katlyn Hankins said that autopsy would not be affected by removing ET tube and that it would be ok to extubate patient per family request.   6596 Patient extubated to 2 LPM nasal cannula. Family at bedside. 9344 Patient respirations labored at this time, Morphine 2mg IV given. 1840 Patient breathing easier. Family at bedside. 1910 Report to Jonelle Dandy RN. Family remains at bedside.

## 2017-05-01 NOTE — PROGRESS NOTES
Patient down in MRI, spoke with wife and daughter extensively. CT this morning shows decompression of hematoma in occipital lobe, however there is extensive intraventricular hemorrhage in lateral, third and fourth ventricles. Pupils small, reactive. Not waking up minimal movement to deep stimuli. 4/4 bottles positive for GPC in blood. I talked with family about options. Discussed ventriculostomy for IVH, decompression , etc.    They asked very appropriate questions about functional recovery. Given that he has not awoken, and the extent of the bleed, I do not think he will get back to his normal state. Likely hemiplegic on left and may not wake up at all- discussed trach and PEG as possibilities. They have had recent discussions about this and he did not want to be kept in an impaired state. We discussed compassionate extubation. She will discuss with rest of family, but I believe they are leaning towards comfort care. Discussed with critical care team on rounds.

## 2017-05-01 NOTE — H&P
Hospitalist Admission Note    NAME: Edelmira Walls III   :  1955   MRN:  [id number]     Date/Time:  2017 10:34 PM    Patient PCP: Dilcia Huertas MD  ________________________________________________________________________    My assessment of this patient's clinical condition and my plan of care is as follows. Assessment / Plan:  Would ask neurosurgeon in am whether pt be under their service With craniotomy    Large intracerebral hemorrhage with emergent craniotomy  Shock ( not sure of the etiology) with fever tachy with elevated LFT, elevated bili  Requiring pressors r/o septic shock vs, ICH causing the presentation  intubated    Under neurosurgery care, concern for underlying brain tumour  Follow the repeat CT and MRI brain  Empiric antibiotics   Follow the blood and urine cultures  Chest xray neg, ua with 1+ bacteria  Target SBP <140 with MAP of 65, if more than that needs nicardipien drip,  Now on pressors  On keppra    Hyperlipidemia  Hypertension  Holding all the PTA meds, as above    Very high risk of cardiac arrest.       Code Status: full  Surrogate Decision Maker:wife    DVT Prophylaxis: scds  GI Prophylaxis: not indicated    Baseline: normal functional status        Subjective:   CHIEF COMPLAINT: altered mental status    HISTORY OF PRESENT ILLNESS:     Pt intubated and sedated. cannot provide me any history     Nayla Sagastume is a 62 y.o.   male who with history of hypertension, hyperlipidemia  Has been having the neck pain to the PCP office,on  has been on prednisone and valium. Initially improved, then worsened ,  got another course of prednisone. With intermittent fevers for the last 4 days, and today with altered mental status  Presented to the ER    Found to have [address], for emergent craniotomy    We were asked to admit for work up and evaluation of the above problems.      Past Medical History:   Diagnosis Date    Hypertension         Past Surgical History:   Procedure Laterality Date    HX HERNIA REPAIR      HX TONSILLECTOMY         Social History   Substance Use Topics    Smoking status: Former Smoker     Quit date: 1/1/1985    Smokeless tobacco: Never Used    Alcohol use Yes      Comment: occasional        Family History   Problem Relation Age of Onset    Cancer Mother      colon cancer    COPD Father     Heart Failure Father     Cancer Father      prostate cancer    Heart Disease Father      viral cardiomypathy    Breast Cancer Sister      Allergies   Allergen Reactions    Pcn [Penicillins] Rash     Per wife, rash as a kid        Prior to Admission medications    Medication Sig Start Date End Date Taking? Authorizing Provider   predniSONE (DELTASONE) 20 mg tablet 60 mg daily x 3 days, then 40 mg daily x 3 days, then 20 mg daily x 3 days, then stop. 4/25/17   Sandor Syed III, DO   diazePAM (VALIUM) 5 mg tablet Take 1 Tab by mouth nightly as needed for Sleep. Max Daily Amount: 5 mg. Indications: MUSCLE SPASM 4/25/17   Carlos Gonzalez III, DO   diazePAM (VALIUM) 2 mg tablet Take 1-2 Tabs by mouth nightly as needed. Max Daily Amount: 4 mg. Indications: MUSCLE SPASM 4/21/17   Carlos Gonzalez III, DO   lisinopril (PRINIVIL, ZESTRIL) 10 mg tablet TAKE 1 TABLET DAILY 1/20/17   Austin Morgan MD   atorvastatin (LIPITOR) 40 mg tablet TAKE 1 TABLET DAILY 12/8/16   Austin Morgan MD   omeprazole (PRILOSEC) 20 mg capsule TAKE 1 CAPSULE TWICE A DAY 6/17/16   Austin Morgan MD   chlorpheniramine-HYDROcodone (TUSSIONEX) 10-8 mg/5 mL suspension Take 5 mL by mouth every twelve (12) hours as needed for Cough. Max Daily Amount: 10 mL. 12/23/15   Bhavani Valero MD   [address]right [address] (GLUCOSAMINE &CHONDROIT-MV-MIN3 PO) Take  by mouth. Historical Provider   omega-3 fatty acids-vitamin e (FISH OIL) 1,000 mg cap Take 1 Cap by mouth.     Historical Provider   NASONEX 50 mcg/actuation nasal spray USE 2 SPRAYS NASALLY DAILY AS DIRECTED 1/20/15   Austin Morgan MD fexofenadine (ALLEGRA) 180 mg tablet Take 1 Tab by mouth daily. 3/11/13   Rosaura Celaya MD   amada's wort 600 mg Cap Take  by mouth. Historical Provider   MULTIVITS W-FE,OTHER MIN (CENTRUM PO) Take  by mouth. Historical Provider       REVIEW OF SYSTEMS:     I am not able to complete the review of systems because:  y The patient is intubated and sedated    The patient has altered mental status due to his acute medical problems    The patient has baseline aphasia from prior stroke(s)    The patient has baseline dementia and is not reliable historian    The patient is in acute medical distress and unable to provide information               Objective:   VITALS:    Visit Vitals    /67    Pulse 89    Temp (!) 100.6 °F (38.1 °C)    Resp 22    Ht 6' 1\" (1.854 m)    Wt 81.3 kg (179 lb 3.7 oz)    SpO2 100%    BMI 23.65 kg/m2       PHYSICAL EXAM:    General:    Intubated and sedated  HEENT: S/p craniotomy  Neck:  Supple, symmetrical,  thyroid: non tender  Lungs:   B/l decreased air entry No rales. Chest wall:  No tenderness  No Accessory muscle use. Heart:   Regular  rhythm,  No  murmur   No edema  Abdomen:   Soft, non-tender. Not distended. Bowel sounds normal  Extremities: No cyanosis. No clubbing,    Skin:     Not pale.   Not Jaundiced  No rashes   Psych:  Cannot be assess  Neurologic: Intubated and sedated  _______________________________________________________________________  Care Plan discussed with:    Comments   Patient     Family      RN y    Care Manager                    Consultant:      _______________________________________________________________________  Expected  Disposition:   Home with Family y   HH/PT/OT/RN    SNF/LTC    EDY    ________________________________________________________________________  TOTAL TIME:  65Minutes    Critical Care Provided     Minutes non procedure based      Comments    y Reviewed previous records   >50% of visit spent in counseling and coordination of care y Discussion with patient and/or family and questions answered       ________________________________________________________________________  Signed: Jonny Lam MD    Procedures: see electronic medical records for all procedures/Xrays and details which were not copied into this note but were reviewed prior to creation of Plan. LAB DATA REVIEWED:    Recent Results (from the past 24 hour(s))   CBC WITH AUTOMATED DIFF    Collection Time: 04/30/17  1:40 PM   Result Value Ref Range    WBC 28.9 (H) 4.1 - 11.1 K/uL    RBC 4.47 4.10 - 5.70 M/uL    HGB 14.4 12.1 - 17.0 g/dL    HCT 40.9 36.6 - 50.3 %    MCV 91.5 80.0 - 99.0 FL    MCH 32.2 26.0 - 34.0 PG    MCHC 35.2 30.0 - 36.5 g/dL    RDW 13.2 11.5 - 14.5 %    PLATELET 813 851 - 711 K/uL    NEUTROPHILS 82 %    BAND NEUTROPHILS 8 %    LYMPHOCYTES 3 %    MONOCYTES 7 %    EOSINOPHILS 0 %    BASOPHILS 0 %    ABS. NEUTROPHILS 26.0 K/UL    ABS. LYMPHOCYTES 0.9 K/UL    ABS. MONOCYTES 2.0 K/UL    ABS. EOSINOPHILS 0.0 K/UL    ABS. BASOPHILS 0.0 K/UL    RBC COMMENTS NORMOCYTIC, NORMOCHROMIC      DF MANUAL     METABOLIC PANEL, COMPREHENSIVE    Collection Time: 04/30/17  1:40 PM   Result Value Ref Range    Sodium 136 136 - 145 mmol/L    Potassium 3.6 3.5 - 5.1 mmol/L    Chloride 102 97 - 108 mmol/L    CO2 23 21 - 32 mmol/L    Anion gap 11 5 - 15 mmol/L    Glucose 124 (H) 65 - 100 mg/dL    BUN 27 (H) 6 - 20 MG/DL    Creatinine 1.07 0.70 - 1.30 MG/DL    BUN/Creatinine ratio 25 (H) 12 - 20      GFR est AA >60 >60 ml/min/1.73m2    GFR est non-AA >60 >60 ml/min/1.73m2    Calcium 9.3 8.5 - 10.1 MG/DL    Bilirubin, total 1.1 (H) 0.2 - 1.0 MG/DL    ALT (SGPT) 259 (H) 12 - 78 U/L    AST (SGOT) 139 (H) 15 - 37 U/L    Alk.  phosphatase 267 (H) 45 - 117 U/L    Protein, total 7.7 6.4 - 8.2 g/dL    Albumin 2.8 (L) 3.5 - 5.0 g/dL    Globulin 4.9 (H) 2.0 - 4.0 g/dL    A-G Ratio 0.6 (L) 1.1 - 2.2     LACTIC ACID, PLASMA    Collection Time: 04/30/17  1:40 PM   Result Value Ref Range Lactic acid 2.0 0.4 - 2.0 MMOL/L   EKG, 12 LEAD, INITIAL    Collection Time: 04/30/17  2:00 PM   Result Value Ref Range    Ventricular Rate 100 BPM    Atrial Rate 100 BPM    P-R Interval 116 ms    QRS Duration 90 ms    Q-T Interval 320 ms    QTC Calculation (Bezet) 412 ms    Calculated P Axis 56 degrees    Calculated R Axis 25 degrees    Calculated T Axis 63 degrees    Diagnosis       Sinus rhythm with premature atrial complexes  Possible Left atrial enlargement  Septal infarct , age undetermined  Abnormal ECG  No previous ECGs available     URINALYSIS W/ REFLEX CULTURE    Collection Time: 04/30/17  2:16 PM   Result Value Ref Range    Color DARK YELLOW      Appearance CLOUDY (A) CLEAR      Specific gravity 1.023 1.003 - 1.030      pH (UA) 6.0 5.0 - 8.0      Protein 100 (A) NEG mg/dL    Glucose NEGATIVE  NEG mg/dL    Ketone NEGATIVE  NEG mg/dL    Blood LARGE (A) NEG      Urobilinogen 1.0 0.2 - 1.0 EU/dL    Nitrites NEGATIVE  NEG      Leukocyte Esterase SMALL (A) NEG      WBC 5-10 0 - 4 /hpf    RBC 20-50 0 - 5 /hpf    Epithelial cells FEW FEW /lpf    Bacteria 1+ (A) NEG /hpf    UA:UC IF INDICATED URINE CULTURE ORDERED (A) CNI      Amorphous Crystals 1+ (A) NEG    Granular cast 0-2 (A) NEG /lpf   BILIRUBIN, CONFIRM    Collection Time: 04/30/17  2:16 PM   Result Value Ref Range    Bilirubin UA, confirm NEGATIVE  NEG     DRUG SCREEN, URINE    Collection Time: 04/30/17  2:16 PM   Result Value Ref Range    AMPHETAMINE NEGATIVE  NEG      BARBITURATES NEGATIVE  NEG      BENZODIAZEPINE POSITIVE (A) NEG      COCAINE NEGATIVE  NEG      METHADONE NEGATIVE  NEG      OPIATES NEGATIVE  NEG      PCP(PHENCYCLIDINE) NEGATIVE  NEG      THC (TH-CANNABINOL) NEGATIVE  NEG      Drug screen comment (NOTE)    BLOOD GAS, ARTERIAL    Collection Time: 04/30/17  3:10 PM   Result Value Ref Range    pH 7.35 7.35 - 7.45      PCO2 43 35.0 - 45.0 mmHg    PO2 272 (H) 80 - 100 mmHg    O2  (H) 92 - 97 %    BICARBONATE 23 22 - 26 mmol/L    BASE DEFICIT 2.3 mmol/L    O2 METHOD VENTILATOR      FIO2 100 %    MODE A/C      Tidal volume 550      SET RATE 16      EPAP/CPAP/PEEP 5.0      Sample source ARTERIAL      SITE RIGHT RADIAL      BASILIA'S TEST YES     PROTHROMBIN TIME + INR    Collection Time: 04/30/17  3:26 PM   Result Value Ref Range    INR 1.4 (H) 0.9 - 1.1      Prothrombin time 14.1 (H) 9.0 - 11.1 sec   PTT    Collection Time: 04/30/17  3:26 PM   Result Value Ref Range    aPTT 33.6 (H) 22.1 - 32.5 sec    aPTT, therapeutic range     58.0 - 77.0 SECS   TYPE & SCREEN    Collection Time: 04/30/17  3:56 PM   Result Value Ref Range    Crossmatch Expiration 05/03/2017     ABO/Rh(D) O POSITIVE     Antibody screen NEG    BLOOD GAS, ARTERIAL    Collection Time: 04/30/17  5:10 PM   Result Value Ref Range    pH 7.44 7.35 - 7.45      PCO2 33 (L) 35.0 - 45.0 mmHg    PO2 342 (H) 80 - 100 mmHg    O2  (H) 92 - 97 %    BICARBONATE 22 22 - 26 mmol/L    BASE DEFICIT 1.7 mmol/L    O2 METHOD VENTILATOR      FIO2 100 %    MODE A/C      Tidal volume 750      SET RATE 16      EPAP/CPAP/PEEP 0.0      Sample source ARTERIAL      SITE DRAWN FROM ARTERIAL LINE      BASILIA'S TEST N/A     FFP, ALLOCATE    Collection Time: 04/30/17  6:00 PM   Result Value Ref Range    Unit number [id number]     Blood component type FP24H,THAW     Unit division 00     Status of unit ISSUED     Unit number [id number]     Blood component type FP24H,THAW     Unit division 00     Status of unit ISSUED    BLOOD GAS, ARTERIAL    Collection Time: 04/30/17  6:40 PM   Result Value Ref Range    pH 7.52 (H) 7.35 - 7.45      PCO2 25 (L) 35.0 - 45.0 mmHg    PO2 178 (H) 80 - 100 mmHg    O2 SAT 99 (H) 92 - 97 %    BICARBONATE 20 (L) 22 - 26 mmol/L    BASE DEFICIT 0.9 mmol/L    O2 METHOD VENTILATOR      FIO2 50 %    MODE A/C      Tidal volume 750      SET RATE 16      EPAP/CPAP/PEEP 0.0      Sample source ARTERIAL      SITE DRAWN FROM ARTERIAL LINE      BASILIA'S TEST N/A     BLOOD GAS, ARTERIAL Collection Time: 04/30/17  8:29 PM   Result Value Ref Range    pH 7.50 (H) 7.35 - 7.45      PCO2 29 (L) 35.0 - 45.0 mmHg    PO2 183 (H) 80 - 100 mmHg    O2 SAT 99 (H) 92 - 97 %    BICARBONATE 22 22 - 26 mmol/L    BASE EXCESS 0.0 mmol/L    O2 METHOD TRACH COL      FIO2 50 %    MODE A/C      Tidal volume 550      SET RATE 16      EPAP/CPAP/PEEP 0.0      Sample source ARTERIAL      SITE DRAWN FROM ARTERIAL LINE      BASILIA'S TEST N/A

## 2017-05-01 NOTE — INTERDISCIPLINARY ROUNDS
Critical care interdisciplinary rounds held on 5/1/17. Following members present, Pharmacy, Diabetes Treatment, Case Management, Occupational Therapy, Physical Therapy, Pastoral Care  and Nutrition. Plan of care discussed. See clinical pathway fo plan of care and interventions and desired outcomes.

## 2017-05-01 NOTE — CONSULTS
[address]   0 [facility]       Name:  Fernanda Paulino   MR#:  [id number]   :  1955   Account #:  [de-identified]    Date of Consultation:  2017   Date of Adm:  2017       REASON FOR CONSULTATION: Intracerebral hemorrhage. HISTORY: The patient is a 28-year-old gentleman. He was brought to   the hospital by his wife, who is a CT tech here. She noticed he has   been acting more confused. He is typically sharp and with it, he works   as a clinical psychologist. He has been having intermittent fevers for   several days. He has also been complaining of hip pain, flank pain,   headache, neck pain and generalized body aches. He was taking   Tylenol for his fevers. He is on a steroid taper for cervical strain as   well. When he came in the hospital, he had a CAT scan done of his   brain. On returning from the CAT scan, he was noticed to have agonal   breathing and becoming less responsive. He developed facial   drooping, and then after this, he was unable to spontaneously move   his extremities. Head CT showed 2 intracerebral hemorrhages, and I   was called for evaluation. PAST MEDICAL HISTORY: Above acute illness. He has a history of   hypertension and he takes medicines. He has not been hypertensive in   the recent past. Hypercholesterolemia and peptic ulcer disease. SOCIAL HISTORY: Former tobacco smoker. Positive alcohol. No illicit   drug use. FAMILY HISTORY: Cancer, COPD and heart disease. REVIEW OF SYSTEMS: Unable to obtain from the patient secondary   to being intubated. PHYSICAL EXAMINATION   VITAL SIGNS: His blood pressure is 133/84, pulse is 82, respirations   20, saturations 96%. GENERAL: He is a well-developed, well-nourished gentleman,   intubated, in bed. NEUROLOGIC: His head is otherwise normocephalic, atraumatic. Pupils are equal, regular, react to light.  He does not open his eyes to   deep painful stimuli. He has no movement to deep painful stimuli. His   corneals are intact. Doll's eyes are intact. He has a cough and gag on   suctioning. The rest of his neurologic exam is unable to be obtained   secondary to his neurologic status. DIAGNOSTIC DATA: CT scan shows a large 4 x 7 x 4.2 intra-axial   hematoma in the right parietal region. There is a second smaller one in   the left frontal region. IMPRESSION   1. Subarachnoid hemorrhage. 2. Febrile illness. PLAN; Given his acute decline, I discussed with the family taking him   emergently to the operating room to decompress his brain. We   discussed the risks including bleeding, rebleeding into the area, the   risk of infection, the risk of stroke, coma, death. We will take him emergently to the operating room.         Karol Lincoln MD MM / Claudy Dejesus   D:  05/01/2017   14:39   T:  05/01/2017   15:04   Job #:  [id number]

## 2017-05-01 NOTE — DIABETES MGMT
DTC Progress Note    Recommendations/ Comments: Pt discussed with rounding team and Dr. Jaz Jones. Plan to monitor glucose via morning chemistries. If BG begins trending down then recommend beginning POC glucose to monitor for hypoglycemia secondary to elevated LFT's.    NURSING : HYPOGLYCEMIC RISK ASSESSMENT    Patient is at increased risk for hypoglycemia due to to the following conditions: elevated LFT's    Please monitor BG levels and follow the hypoglycemia protocol for treatment. Chart reviewed on Vitaliy Pies III during Multidisciplinary Rounds. A1c:   No results found for: HBA1C, HGBE8, [id number]        Recent Glucose Results:   Lab Results   Component Value Date/Time     (H) 05/01/2017 03:52 AM     (H) 04/30/2017 01:40 PM    GLUCPOC 157 (H) 05/01/2017 04:00 AM        Lab Results   Component Value Date/Time    Creatinine 0.95 05/01/2017 03:52 AM       Active Orders   Diet    DIET NPO        PO intake: No data found. Will continue to follow as needed. Thank you.   SANIA DiazN, RN, [address]

## 2017-05-01 NOTE — OP NOTES
[address], [address]   OP NOTE       Name:  Ruba Garcia   MR#:  [id number]   :  1955   Account #:  [de-identified]    Surgery Date:  2017   Date of Adm:  2017       DATE OF PROCEDURE: 2017. PREOPERATIVE DIAGNOSIS: Intracerebral hemorrhage. POSTOPERATIVE DIAGNOSES:   1. Intracerebral hemorrhage. 2. Possible brain tumor. PROCEDURES PERFORMED:      SURGEON: Deidre Leong. Marah Harp MD.    FIRST ASSISTANT: OR staff. ANESTHESIA: General endotracheal.    COMPLICATIONS: None. ESTIMATED BLOOD LOSS: 250 mL. SPECIMENS REMOVED: As below, permanent specimen of   intracerebral hemorrhage and possible brain tumor. INDICATIONS: The patient is a 81-year-old gentleman who presented   with altered mental status. He quickly became obtunded in the   emergency room and had to be intubated. On CT scan he was found   to have a large intracerebral hemorrhage on the right as well as   smaller one in the left frontal. It was decided to take him to the   operating room emergently for decompression of his brain. PROPHYLAXES: Two grams of Ancef were given prior to incision and   orders written to stop within 24 hours. SCD's used for the entire case. FINDINGS: Large intracerebral hemorrhage with possible brain tumor   within it and around it. DESCRIPTION OF PROCEDURE: The patient was brought to the   operating room. After appropriate anesthesia was administered, he   was placed in a Marshall head burns and the right side of his head   was clipped, prepped and draped in usual sterile fashion. An inverted   horseshoe-shape was mapped out and infiltrated with 1% lidocaine   with 1:100,000 epinephrine. Incision was made using a #10 blade and Bovie electrocautery was   used to come through subcutaneous tissue. Vani clips were applied   to the skin edges.  This was then taken down with a periosteal elevator,   Lucero electrocautery, and tacked back. Three bur holes were made in   the frontoparietal and posterior parietal areas using a high-speed drill. A wire pass drill was then used to connect the bur holes for the   craniotomy. The bone flap was then elevated and protected for the rest   of the case. The dura was quite tight. A small incision was made   inferiorly and then opened up using Metzenbaum scissors. The brain immediately started swelling up. There was an obvious area   where this hemorrhage had reached the surface. This was incised   and a blood clot was expressed. Entering this, several large pieces of   blood clot came out as well as multiple areas of abnormal brain tissue,   which could possibly be a brain tumor. These were sent for permanent   specimens. At this point, the area was irrigated copiously and more blood clot was   expressed in all directions. This came back to raw brain. Extensive   hemostasis was then obtained and the raw edges were then covered   with Surgicel. Closure was done in an interrupted fashion in the dura. The bone flap was then replaced using miniplates. Central tack-up   suture was placed. The area was irrigated copiously. Closure was   done in anatomical layers. The skin was reapproximated using staples. Sponge and needle counts were correct x2. Dr. Otto Marrero was present for the entire case from start to finish.         Brian Acosta MD MM / Milton Cárdenas   D:  04/30/2017   19:11   T:  05/01/2017   00:04   Job #:  [id number]

## 2017-05-01 NOTE — PROGRESS NOTES
DDNR and DNR paper have been signed. Discussed with his wife. Comfort measure only. Mrs. Echavarria, pt's spouse wishes the autopsy of pt to find out the cause of death.     dk

## 2017-05-01 NOTE — PROGRESS NOTES
Follow up visit to check on family.  from Myriam Murillo Tricia was present and visiting with family. Offered presence and assured family of continued pastoral care support. [phone]. Visit by: Rick Cruz. Reji Guerrero.  Irena Chavez MA, [facility]    Lead  [employer] Development [employer] Advancement

## 2017-05-01 NOTE — PROGRESS NOTES
05/01/17 0614   ABCDE Bundle   SBT Safety Screen Passed No   SBT Screen Reason for Failure (Doctor's order not to do SBT until Neurosurgeon sees patient)

## 2017-05-02 NOTE — DISCHARGE SUMMARY
Death summary    Pt was admitted for Intracranial bleed and was taken to OR emergently. Pt was later on transferred to ICU s/p intubation. Was not responding to the treatment. Family was informed about the poor prognosis by multiple sub specialties. Pt was made DNR and compassionately extubated. Pt was pronounced dead at 0154 AM.    Cause of death:  Intracranial hemorrhage  Septic shock      Problems treated in the hospital listed below:  Large intracerebral hemorrhage with emergent craniotomy  Shock ( not sure of the etiology) with fever tachy with elevated LFT, elevated bili  Requiring pressors r/o septic shock vs, ICH causing the presentation  intubated  S/P Craniotomy  Was Under neurosurgery care, there was a concern for underlying brain tumour  Followed the serial CT scans     Septic shock  Treated with Empiric antibiotics   Prelim Bcx were growing GPC in 4 bottles  Chest xray neg, ua with 1+ bacteria  Was On pressors  On keppra      S/P Craniotomy  As above  Pt intubated, on vent.  Intensivist followed.     Hyperlipidemia PTA  Hypertension PTA

## 2017-05-02 NOTE — PROGRESS NOTES
Bedside and Verbal shift change report given to AUGIE Kirby RN (oncoming nurse) by Wilfrido Lara. Orquidea Riggs (offgoing nurse). Report included the following information SBAR, Kardex, Intake/Output, MAR, Recent Results and Cardiac Rhythm NSR, frequent PVC's, rate 60-80's.   1950: Limited assessment completed. Patient resting with eyes closed, multiple family members at bedside following compassionate extubation. Respirations even and unlabored. Bedside monitor placed on Comfort screen per family wishes. Discussed plan of care with patient's wife at bedside. Will medicate for comfort and ease of respirations. Will give scheduled Keppra, but hold antibiotics per family request. Lungs coarse throughout, remains on 2L NC. Phan catheter in place, draining yellow urine. Dressing remains intact to head. RIJ TLCVL infusing NS +20meq KCL @ 75ml/hr, NS @ Truddie Gutter. SCD's remain in place. R arterial line in place and NS @ KVO per pressure bag to maintain patency, but not removed as patient's wife has voiced request for autopsy following demise. 2028: Morphine 2mg IV given for ease of respirations. 2111: Medicated with morphine 4mg IV for family c/o \"looks like he is in pain. \" Oral care provided per family request.   2158: Medicated with morphine 4mg IV for ease of respirations. Oral suctioning performed, scant thick white sputum returned. 2210: HR up to 120-130's, RR 30's, fine tremor noted throughout upper body. Patient moaning. No prn seizure medication on MAR, patient has received Keppra tonight. Paged Dr. Parth Fischer to request Ativan. 2215: Paged Dr. Imelda Abbasi, on call for Dr. Wen Wood. Notified of seizure activity despite Keppra 500mg IV q12hrs, advised compassionate extubation this afternoon. Orders received for Ativan 2mg IV now, may repeat in 15 minutes for continued seizure. One time dose of Keppra 500mg IV now. 2242: Tremor has stopped after first dose of Ativan. However, HR remains 110's, RR remain 30's, loud respirations.  Medicated with Morphine 4mg IV for ease of respiration. 2321: , RR 38. Medicated with Morphine 8mg IV for ease of respirations. 2338: HR and RR remain elevated despite large doses of morphine. Medicated with Dilaudid 0.2mg IV for comfort. 0009: Medicated with Morphine 8mg IV for comfort. Remains tachycardic and tachypneic with labored respirations. BP remains stable. Family remains at bedside. 0021: Paged Dr. Coni Foss re: labored respirations, tachycardia and tachypnea despite 30mg Morphine given over past 5 hours, 2mg Ativan and Dilaudid 0.2mg. Orders received to continue giving opiates until patient comfortable. 6118: Medicated with Morphine 8mg for ease of respirations. Lavender patch applied to aid with sleep, stress relief and headache relief. 0113: RR down to 24, but remain labored. Medicated with morphine 8mg IV. 0130: HR dropped back to baseline, 70-80's, RR 15.   0150: HR suddenly dropped to 30's. Family at bedside, holding his hands. 0154: Patient no longer breathing, asystole on monitor. No heart tones noted. Advised family. Paged Alyssa Evans,  and notified, as well as Dr. Margi Tripp to pronounce death. 0206: Notified [facility] (Rohit)of patient's time of death. Patient is not a candidate for organ or tissue donation due to diagnosis of possible cancer and sepsis. 12: Family has decided against autopsy. Lines and tubes removed, post-mortem care completed. 4497: Eye bank calls, declined for eye donation. 0345: [place] [facility] Family to notify nursing supervisor of [facility] or crematory services.

## 2017-05-02 NOTE — PROGRESS NOTES
Called to pronounce patient. No response to noxious stimuli. No spontaneous breath sounds nor cardiac sounds. Pupils fixed and dilated. TOD: 0154. Family notified and grieving appropriately.    D/C Summary, death certificate, and death note to be completed by Attending MD.  Family considering autopsy  Delmer Sánchez MD

## 2017-05-02 NOTE — PROGRESS NOTES
Paged to respond to patient's passing. Pts' wife and two children at bedside. Offered words of comfort and support to family. Family grieving; finding comfort in having discussed patient's wishes prior to this happening and knowing what his wishes were. Family is unsure of  home/cremation society at this time and will call back to the hospital and ask for the nursing supervisor when they have decided. Wife decided against an autopsy. Family left hospital; assured family of prayers for days ahead; wife and family expressed appreciation for pastoral care support. [phone]-PRAY. Visit by: Anna Marie Villagran. Antonia Miller.  Irena Chavez MA, [facility]    Lead  Profession Development & Advancement

## 2017-05-03 LAB
BACTERIA SPEC CULT: ABNORMAL
BACTERIA SPEC CULT: ABNORMAL
CC UR VC: ABNORMAL
SERVICE CMNT-IMP: ABNORMAL
SERVICE CMNT-IMP: ABNORMAL

## 2024-02-13 NOTE — PROGRESS NOTES
Follow up visit to support patient's family as  had spoken with family. Pt's wife and three children were present. Wife devastated, as was patient's children; son Adia Joyner was most tearful and upset and having a hard time processing situation. I offered supportive listening as family shared views that Mr. Echavarria would not want to continue on this way; family has had multiple conversations around a situation such as this occurring. Wife says family was raised Protestant but are non-practicing. I offered prayer and family accepted; we prayed together at bedside. I assured family of continued pastoral care support throughout the day; pastoral care will continue to follow. 287-PRAY. Visit by: Nakita Steele.  Irena Chavez MA, [facility]    Lead  Profession Development & Advancement 2

## 2025-06-09 NOTE — PROGRESS NOTES
Spiritual Care Assessment/Progress Notes    [facility] [id number]  [id number]    1955  62 y.o.  male    Patient Telephone Number: [phone] (home)   Anabaptist Affiliation: Other   Language: English   Extended Emergency Contact Information  Primary Emergency Contact: [address] [address] [address]  Home Phone: [phone]  Relation: Spouse   Patient Active Problem List    Diagnosis Date Noted    Schneider's esophagus 03/27/2015    HTN (hypertension) 09/10/2010    Pure hypercholesterolemia 03/12/2010    Family hx of colon cancer 03/12/2010    PUD (peptic ulcer disease) 03/12/2010    GERD (gastroesophageal reflux disease) 03/12/2010    Allergic rhinitis 03/12/2010        Date: 4/30/2017       Level of Anabaptist/Spiritual Activity:  []         Involved in barbara tradition/spiritual practice    []         Not involved in barbara tradition/spiritual practice  [x]         Spiritually oriented    []         Claims no spiritual orientation    []         seeking spiritual identity  []         Feels alienated from Mandaeism practice/tradition  []         Feels angry about Mandaeism practice/tradition  [x]         Spirituality/Mandaeism tradition is a resource for coping at this time.   []         Not able to assess due to medical condition    Services Provided Today:  []         crisis intervention    []         reading Scriptures  [x]         spiritual assessment    []         prayer  [x]         empathic listening/emotional support  []         rites and rituals (cite in comments)  []         life review     []         Mandaeism support  []         theological development   []         advocacy  []         ethical dialog     []         blessing  []         bereavement support    [x]         support to family  []         anticipatory grief support   []         help with AMD  []         spiritual guidance    []         meditation      Spiritual Care Needs  [x]         Emotional Support  [x]         Spiritual/Anabaptist Care  [] Stable 107/73  Continue with lisinopril-hydrochlorothiazide 10-12.5 mg daily   Low salt diet   Regular exercise   Orders:    lisinopriL-hydrochlorothiazide 10-12.5 mg tablet; Take 1 tablet by mouth once daily.    Comprehensive Metabolic Panel; Future     Loss/Adjustment  []         Advocacy/Referral                /Ethics  []         No needs expressed at               this time  []         Other: (note in               comments)  Spiritual Care Plan  [x]         Follow up visits with               Pt/family as able  []         Provide materials  []         Schedule sacraments  []         Contact Community               Clergy  []         Follow up as needed  []         Other: (note in               comments)     Comments: Staff request for  support to family of pt in ER. Pt's wife Bella Escamilla) was outside of the room, pt's daughter Brenden Farmer) and the daughter's  arrived later. According to pt's wife,  had two brain bleeds and was not responsive at the moment. She shared that this event is shocking to them because he seemed fine earlier. They have another son in the area but he has no phone so it was difficult to get a hold of him. Fam seemed to be supporting one another well. Teo Mohan did not share her own barbara tradition but shared that Sindhu Naylor is a soon to be [place]  and spiritually they are doing well for now. Advised of  availability and assured them of continued support as needed/ desired. Deny Resendiz M.S.   Spiritual Care Department  If need arise please call [facility] ([phone])

## (undated) DEVICE — SINGLE BASIN: Brand: CARDINAL HEALTH

## (undated) DEVICE — CODMAN® SURGICAL PATTIES 1/2" X 3" (1.27CM X 7.62CM): Brand: CODMAN®

## (undated) DEVICE — SUTURE NRLN SZ 4-0 L18IN NONABSORBABLE BLK L13MM TF 1/2 CIR C584D

## (undated) DEVICE — FLOSEAL MATRIX IS INDICATED IN SURGICAL PROCEDURES (OTHER THAN IN OPHTHALMIC) AS AN ADJUNCT TO HEMOSTASIS WHEN CONTROL OF BLEEDING BY LIGATURE OR CONVENTIONALPROCEDURES IS INEFFECTIVE OR IMPRACTICAL.: Brand: FLOSEAL HEMOSTATIC MATRIX

## (undated) DEVICE — DRAPE FLD WRM W44XL66IN C6L FOR INTRATEMP SYS THERMABASIN

## (undated) DEVICE — TOOL 9AC90 LEGEND 9CM 9MM AC: Brand: MIDAS REX

## (undated) DEVICE — MAYFIELD® DISPOSABLE ADULT SKULL PIN (PLASTIC BASE): Brand: MAYFIELD®

## (undated) DEVICE — KENDALL SCD EXPRESS SLEEVES, KNEE LENGTH, MEDIUM: Brand: KENDALL SCD

## (undated) DEVICE — HANDLE LT SNAP ON ULT DURABLE LENS FOR TRUMPF ALC DISPOSABLE

## (undated) DEVICE — INFECTION CONTROL KIT SYS

## (undated) DEVICE — DEVICE TRNSF SPIK STL 2008S] MICROTEK MEDICAL INC]

## (undated) DEVICE — BLADE ASSEMB CLP HAIR COARSE --

## (undated) DEVICE — CATH URETH INTMIT ROB 12FR FUN -- CONVERT TO ITEM 151713

## (undated) DEVICE — NEEDLE HYPO 21GA L1.5IN INTRAMUSCULAR S STL LATCH BVL UP

## (undated) DEVICE — SUTURE VCRL SZ 2-0 L18IN ABSRB UD L26MM CP-2 1/2 CIR REV J762D

## (undated) DEVICE — TOOL 8TA11 LEGEND 8CM 1.1MM TA: Brand: MIDAS REX ™

## (undated) DEVICE — SPONGE HEMOSTAT CELLULS 4X8IN -- SURGICEL

## (undated) DEVICE — DEVON™ KNEE AND BODY STRAP 60" X 3" (1.5 M X 7.6 CM): Brand: DEVON

## (undated) DEVICE — 1200 GUARD II KIT W/5MM TUBE W/O VAC TUBE: Brand: GUARDIAN

## (undated) DEVICE — GUN CG8900 RANEY CLIP KIT 1PK: Brand: CLIP GUN

## (undated) DEVICE — TOOL 8TD126 LEGEND 8CM 1.2MM 6MM DEPTH: Brand: MIDAS REX ™

## (undated) DEVICE — KERLIX BANDAGE ROLL: Brand: KERLIX

## (undated) DEVICE — SUTURE PERMA-HAND 0 L18IN NONABSORBABLE BLK CT-1 L36MM 1/2 C021D

## (undated) DEVICE — ABDOMINAL PAD: Brand: DERMACEA

## (undated) DEVICE — TOOL F2/8TA23 LEGEND 8CM 2.3MM TAPER: Brand: MIDAS REX™

## (undated) DEVICE — MAGAZINE CM8902 RANEY CLIP GUN 5PK: Brand: CLIP GUN

## (undated) DEVICE — DRAPE,REIN 53X77,STERILE: Brand: MEDLINE

## (undated) DEVICE — CODMAN® DISPOSABLE PERFORATOR 14MM: Brand: CODMAN®

## (undated) DEVICE — GAUZE SPONGES,12 PLY: Brand: CURITY

## (undated) DEVICE — SOLUTION IV 1000ML 0.9% SOD CHL

## (undated) DEVICE — PREP SKN PREVAIL 40ML APPL --

## (undated) DEVICE — SOLUTION IV 250ML 0.9% SOD CHL CLR INJ FLX BG CONT PRT CLSR

## (undated) DEVICE — NDL FLTR TIP 5 MIC 18GX1.5IN --

## (undated) DEVICE — SURGIFOAM SPNG SZ 100

## (undated) DEVICE — SURGICAL PROCEDURE KIT CRANIOTOMY

## (undated) DEVICE — 3M™ IOBAN™ 2 ANTIMICROBIAL INCISE DRAPE 6640EZ: Brand: IOBAN™ 2

## (undated) DEVICE — STOCKINETTE: Brand: DEROYAL